# Patient Record
Sex: FEMALE | Race: WHITE | Employment: FULL TIME | URBAN - METROPOLITAN AREA
[De-identification: names, ages, dates, MRNs, and addresses within clinical notes are randomized per-mention and may not be internally consistent; named-entity substitution may affect disease eponyms.]

---

## 2019-06-06 VITALS — BODY MASS INDEX: 38.92 KG/M2 | WEIGHT: 248 LBS | HEIGHT: 67 IN

## 2019-06-06 RX ORDER — SODIUM CHLORIDE 0.9 % (FLUSH) 0.9 %
5-40 SYRINGE (ML) INJECTION AS NEEDED
Status: CANCELLED | OUTPATIENT
Start: 2019-06-06

## 2019-06-06 RX ORDER — CEFAZOLIN SODIUM/WATER 2 G/20 ML
2 SYRINGE (ML) INTRAVENOUS ONCE
Status: CANCELLED | OUTPATIENT
Start: 2019-06-06 | End: 2019-06-06

## 2019-06-06 RX ORDER — SODIUM CHLORIDE 0.9 % (FLUSH) 0.9 %
5-40 SYRINGE (ML) INJECTION EVERY 8 HOURS
Status: CANCELLED | OUTPATIENT
Start: 2019-06-06

## 2019-06-13 ENCOUNTER — HOSPITAL ENCOUNTER (OUTPATIENT)
Dept: SURGERY | Age: 34
Discharge: HOME | End: 2019-06-13

## 2019-06-17 VITALS — HEIGHT: 68 IN | WEIGHT: 250 LBS | BODY MASS INDEX: 37.89 KG/M2

## 2019-06-17 RX ORDER — IBUPROFEN 200 MG
200 TABLET ORAL
COMMUNITY

## 2019-06-17 NOTE — PERIOP NOTES
Patient verified name and . Order for consent yes found in EHR and matches case posting; patient verifies procedure. Type 1bsurgery, phone assessment complete. Orders yes received. Labs per surgeon: none  Labs per anesthesia protocol: none      Patient answered medical/surgical history questions at their best of ability. All prior to admission medications documented in [facility] Care. Patient instructed to take the following medications the day of surgery according to anesthesia guidelines with a small sip of water: none . Hold all vitamins 7 days prior to surgery and NSAIDS 5 days prior to surgery. Prescription meds to hold:none    Patient instructed on the following:  Arrive at A Entrance, time of arrival to be called the day before by 1700  NPO after midnight including gum, mints, and ice chips  Responsible adult must drive patient to the hospital, stay during surgery, and patient will need supervision 24 hours after anesthesia  Use antibacterial soap in shower the night before surgery and on the morning of surgery  All piercings must be removed prior to arrival.    Leave all valuables (money and jewelry) at home but bring insurance card and ID on       DOS. Do not wear make-up, nail polish, lotions, cologne, perfumes, powders, or oil on skin. Patient teach back successful and patient demonstrates knowledge of instruction.

## 2019-06-18 PROBLEM — S83.242A TEAR OF MEDIAL MENISCUS OF LEFT KNEE: Status: ACTIVE | Noted: 2019-06-18

## 2019-06-18 PROBLEM — S83.32XA TEAR OF ARTICULAR CARTILAGE OF LEFT KNEE AS CURRENT INJURY: Status: ACTIVE | Noted: 2019-06-18

## 2019-06-18 NOTE — BRIEF OP NOTE
BRIEF OPERATIVE NOTE    Date of Procedure: 6/20/2019     Preoperative Diagnosis:  OSTEOCHONDRAL LESION MEDIAL FEMORAL CONDYLE LEFT KNEE      MEDIAL MENISCAL TEAR LEFT KNEE    Postoperative Diagnosis:  OSTEOCHONDRAL LESION MEDIAL FEMORAL CONDYLE LEFT KNEE      OSTEOCHONDRAL LESION TROCHLEA LEFT KNEE      LOOSE BODIES LEFT KNEE    Procedure(s): 1. ARTHROSCOPY LEFT KNEE REMOVAL LOOSE BODIES     2. OPEN OSTEOCHONDRAL ALLOGRAFT TRANSFER MEDIAL FEMORAL CONDYLE, TROCHLEA LEFT KNEE    Surgeon(s) and Role:     * Sean Tidwell MD - Primary         Assistant Staff: None      Surgical Staff:  Circ-1: (Unknown)  Scrub Tech-1: (Unknown)  Scrub Tech-2: (Unknown)  Scrub Tech-3: (Unknown)  No case tracking events are documented in the log. Anesthesia:  GENERAL WITH FEMORAL NERVE BLOCK    Estimated Blood Loss: 20 cc. Complications: NONE    Implants:   Implant Name Type Inv.  Item Serial No.  Lot No. LRB No. Used Action   GRAFT BNE FEM LT MED OC --  - [id number]  GRAFT BNE FEM LT MED OC --  [id number] [employer] [id number] Left 1 Implanted       TOURNIQUET:  95 MINUTES    Anna Shen MD

## 2019-06-18 NOTE — H&P
[facility] HISTORY AND PHYSICAL    Subjective:     Patient is a 35 y.o. FEMALE WITH LEFT KNEE PAIN. SEE OFFICE NOTE. Patient Active Problem List    Diagnosis Date Noted    Tear of articular cartilage of left knee as current injury 06/18/2019    Tear of medial meniscus of left knee 06/18/2019     No past medical history on file. No past surgical history on file. Prior to Admission medications    Medication Sig Start Date End Date Taking? Authorizing Provider   ibuprofen (MOTRIN) 200 mg tablet Take 200 mg by mouth every eight (8) hours as needed. Indications: Pain    Provider, Historical     Allergies   Allergen Reactions    Amoxicillin Hives and Swelling    Penicillin Hives      Social History     Tobacco Use    Smoking status: Never Smoker    Smokeless tobacco: Never Used   Substance Use Topics    Alcohol use: Not Currently      Family History   Problem Relation Age of Onset    No Known Problems Mother     No Known Problems Father       Review of Systems  A comprehensive review of systems was negative except for that written in the HPI. Objective:     No data found. Visit Vitals  LMP 06/10/2019 (Exact Date)     General:  Alert, cooperative, no distress, appears stated age. Head:  Normocephalic, without obvious abnormality, atraumatic. Back:   Symmetric, no curvature. ROM normal. No CVA tenderness. Lungs:   Clear to auscultation bilaterally. Chest wall:  No tenderness or deformity. Heart:  Regular rate and rhythm, S1, S2 normal, no murmur, click, rub or gallop. Extremities: Extremities normal, atraumatic, no cyanosis or edema. Pulses: 2+ and symmetric all extremities. Skin: Skin color, texture, turgor normal. No rashes or lesions. Lymph nodes: Cervical, supraclavicular, and axillary nodes normal.   Neurologic: CNII-XII intact. Normal strength, sensation and reflexes throughout.            Assessment:     Principal Problem: Tear of articular cartilage of left knee as current injury (6/18/2019)    Active Problems:    Tear of medial meniscus of left knee (6/18/2019)        Plan:     The various methods of treatment have been discussed with the patient and family. PATIENT HAS EXHAUSTED NON-OPERATIVE MODALITIES     After consideration of risks, benefits and other options for treatment, the patient has consented to surgical intervention.     SEE OFFICE NOTE    Mone Abbott MD

## 2019-06-20 ENCOUNTER — ANESTHESIA (OUTPATIENT)
Dept: SURGERY | Age: 34
End: 2019-06-20
Payer: COMMERCIAL

## 2019-06-20 ENCOUNTER — HOSPITAL ENCOUNTER (OUTPATIENT)
Age: 34
Setting detail: OBSERVATION
Discharge: HOME | End: 2019-06-21
Attending: ORTHOPAEDIC SURGERY | Admitting: ORTHOPAEDIC SURGERY
Payer: COMMERCIAL

## 2019-06-20 ENCOUNTER — ANESTHESIA EVENT (OUTPATIENT)
Dept: SURGERY | Age: 34
End: 2019-06-20
Payer: COMMERCIAL

## 2019-06-20 ENCOUNTER — APPOINTMENT (OUTPATIENT)
Dept: GENERAL RADIOLOGY | Age: 34
End: 2019-06-20
Attending: ORTHOPAEDIC SURGERY
Payer: COMMERCIAL

## 2019-06-20 PROBLEM — M23.42 BODIES, LOOSE, JOINT, KNEE, LEFT: Status: ACTIVE | Noted: 2019-06-20

## 2019-06-20 PROBLEM — S83.242A TEAR OF MEDIAL MENISCUS OF LEFT KNEE: Status: RESOLVED | Noted: 2019-06-18 | Resolved: 2019-06-20

## 2019-06-20 PROBLEM — M94.262 CHONDROMALACIA OF KNEE, LEFT: Status: ACTIVE | Noted: 2019-06-20

## 2019-06-20 PROBLEM — M94.262 CHONDROMALACIA OF KNEE, LEFT: Status: RESOLVED | Noted: 2019-06-20 | Resolved: 2019-06-20

## 2019-06-20 LAB
EST. AVERAGE GLUCOSE BLD GHB EST-MCNC: 114 MG/DL
GLUCOSE BLD STRIP.AUTO-MCNC: 104 MG/DL (ref 65–100)
HBA1C MFR BLD: 5.6 %
HCG UR QL: NEGATIVE

## 2019-06-20 PROCEDURE — 74011250636 HC RX REV CODE- 250/636

## 2019-06-20 PROCEDURE — 77030019940 HC BLNKT HYPOTHRM STRY -B: Performed by: ANESTHESIOLOGY

## 2019-06-20 PROCEDURE — 77030013708 HC HNDPC SUC IRR PULS STRY –B: Performed by: ORTHOPAEDIC SURGERY

## 2019-06-20 PROCEDURE — L1830 KO IMMOB CANVAS LONG PRE OTS: HCPCS | Performed by: ORTHOPAEDIC SURGERY

## 2019-06-20 PROCEDURE — 74011250636 HC RX REV CODE- 250/636: Performed by: ANESTHESIOLOGY

## 2019-06-20 PROCEDURE — 77030002937 HC SUT MERS J&J -B: Performed by: ORTHOPAEDIC SURGERY

## 2019-06-20 PROCEDURE — 77030002986 HC SUT PROL J&J -A: Performed by: ORTHOPAEDIC SURGERY

## 2019-06-20 PROCEDURE — 76010010054 HC POST OP PAIN BLOCK: Performed by: ORTHOPAEDIC SURGERY

## 2019-06-20 PROCEDURE — 76010000171 HC OR TIME 2 TO 2.5 HR INTENSV-TIER 1: Performed by: ORTHOPAEDIC SURGERY

## 2019-06-20 PROCEDURE — 73560 X-RAY EXAM OF KNEE 1 OR 2: CPT

## 2019-06-20 PROCEDURE — 77030033005 HC TBNG ARTHSC PMP STRY -B: Performed by: ORTHOPAEDIC SURGERY

## 2019-06-20 PROCEDURE — 74011250636 HC RX REV CODE- 250/636: Performed by: ORTHOPAEDIC SURGERY

## 2019-06-20 PROCEDURE — 77030002916 HC SUT ETHLN J&J -A: Performed by: ORTHOPAEDIC SURGERY

## 2019-06-20 PROCEDURE — 77010033678 HC OXYGEN DAILY

## 2019-06-20 PROCEDURE — 77030018836 HC SOL IRR NACL ICUM -A: Performed by: ORTHOPAEDIC SURGERY

## 2019-06-20 PROCEDURE — 77030004434 HC BUR RND STRY -B: Performed by: ORTHOPAEDIC SURGERY

## 2019-06-20 PROCEDURE — 77030006891 HC BLD SHV RESECT STRY -B: Performed by: ORTHOPAEDIC SURGERY

## 2019-06-20 PROCEDURE — 77030003666 HC NDL SPINAL BD -A: Performed by: ORTHOPAEDIC SURGERY

## 2019-06-20 PROCEDURE — 74011000258 HC RX REV CODE- 258: Performed by: ORTHOPAEDIC SURGERY

## 2019-06-20 PROCEDURE — 94760 N-INVAS EAR/PLS OXIMETRY 1: CPT

## 2019-06-20 PROCEDURE — 99218 HC RM OBSERVATION: CPT

## 2019-06-20 PROCEDURE — 77030020163 HC SEAL HEMSTAT HALY -B: Performed by: ORTHOPAEDIC SURGERY

## 2019-06-20 PROCEDURE — 77030031139 HC SUT VCRL2 J&J -A: Performed by: ORTHOPAEDIC SURGERY

## 2019-06-20 PROCEDURE — 77030034270: Performed by: ORTHOPAEDIC SURGERY

## 2019-06-20 PROCEDURE — 74011000250 HC RX REV CODE- 250: Performed by: ANESTHESIOLOGY

## 2019-06-20 PROCEDURE — 74011000250 HC RX REV CODE- 250

## 2019-06-20 PROCEDURE — 77030000032 HC CUF TRNQT ZIMM -B: Performed by: ORTHOPAEDIC SURGERY

## 2019-06-20 PROCEDURE — 77030019908 HC STETH ESOPH SIMS -A: Performed by: ANESTHESIOLOGY

## 2019-06-20 PROCEDURE — 77030006668 HC BLD SHV MENSCS STRY -B: Performed by: ORTHOPAEDIC SURGERY

## 2019-06-20 PROCEDURE — 76060000035 HC ANESTHESIA 2 TO 2.5 HR: Performed by: ORTHOPAEDIC SURGERY

## 2019-06-20 PROCEDURE — 81025 URINE PREGNANCY TEST: CPT

## 2019-06-20 PROCEDURE — 77030010509 HC AIRWY LMA MSK TELE -A: Performed by: ANESTHESIOLOGY

## 2019-06-20 PROCEDURE — 82962 GLUCOSE BLOOD TEST: CPT

## 2019-06-20 PROCEDURE — 76942 ECHO GUIDE FOR BIOPSY: CPT | Performed by: ORTHOPAEDIC SURGERY

## 2019-06-20 PROCEDURE — 77030006788 HC BLD SAW OSC STRY -B: Performed by: ORTHOPAEDIC SURGERY

## 2019-06-20 PROCEDURE — 77030022036 HC BLD SHV TOMCAT STRY -B: Performed by: ORTHOPAEDIC SURGERY

## 2019-06-20 PROCEDURE — 77030018986 HC SUT ETHBND4 J&J -B: Performed by: ORTHOPAEDIC SURGERY

## 2019-06-20 PROCEDURE — 77030003602 HC NDL NRV BLK BBMI -B: Performed by: ANESTHESIOLOGY

## 2019-06-20 PROCEDURE — 83036 HEMOGLOBIN GLYCOSYLATED A1C: CPT

## 2019-06-20 PROCEDURE — 77030012935 HC DRSG AQUACEL BMS -B: Performed by: ORTHOPAEDIC SURGERY

## 2019-06-20 PROCEDURE — 76210000006 HC OR PH I REC 0.5 TO 1 HR: Performed by: ORTHOPAEDIC SURGERY

## 2019-06-20 DEVICE — IMPLANTABLE DEVICE: Type: IMPLANTABLE DEVICE | Site: KNEE | Status: FUNCTIONAL

## 2019-06-20 RX ORDER — SODIUM CHLORIDE 0.9 % (FLUSH) 0.9 %
5-40 SYRINGE (ML) INJECTION AS NEEDED
Status: DISCONTINUED | OUTPATIENT
Start: 2019-06-20 | End: 2019-06-20 | Stop reason: HOSPADM

## 2019-06-20 RX ORDER — SODIUM CHLORIDE 0.9 % (FLUSH) 0.9 %
5-40 SYRINGE (ML) INJECTION EVERY 8 HOURS
Status: DISCONTINUED | OUTPATIENT
Start: 2019-06-20 | End: 2019-06-20 | Stop reason: HOSPADM

## 2019-06-20 RX ORDER — SODIUM CHLORIDE 9 MG/ML
75 INJECTION, SOLUTION INTRAVENOUS CONTINUOUS
Status: DISCONTINUED | OUTPATIENT
Start: 2019-06-20 | End: 2019-06-21 | Stop reason: HOSPADM

## 2019-06-20 RX ORDER — CEFAZOLIN SODIUM/WATER 2 G/20 ML
2 SYRINGE (ML) INTRAVENOUS ONCE
Status: COMPLETED | OUTPATIENT
Start: 2019-06-20 | End: 2019-06-20

## 2019-06-20 RX ORDER — FENTANYL CITRATE 50 UG/ML
INJECTION, SOLUTION INTRAMUSCULAR; INTRAVENOUS AS NEEDED
Status: DISCONTINUED | OUTPATIENT
Start: 2019-06-20 | End: 2019-06-20 | Stop reason: HOSPADM

## 2019-06-20 RX ORDER — TEMAZEPAM 15 MG/1
15 CAPSULE ORAL
Status: DISCONTINUED | OUTPATIENT
Start: 2019-06-20 | End: 2019-06-21 | Stop reason: HOSPADM

## 2019-06-20 RX ORDER — LIDOCAINE HYDROCHLORIDE 10 MG/ML
0.3 INJECTION INFILTRATION; PERINEURAL ONCE
Status: COMPLETED | OUTPATIENT
Start: 2019-06-20 | End: 2019-06-20

## 2019-06-20 RX ORDER — BUPIVACAINE HYDROCHLORIDE AND EPINEPHRINE 5; 5 MG/ML; UG/ML
INJECTION, SOLUTION EPIDURAL; INTRACAUDAL; PERINEURAL
Status: COMPLETED | OUTPATIENT
Start: 2019-06-20 | End: 2019-06-20

## 2019-06-20 RX ORDER — OXYCODONE HYDROCHLORIDE 5 MG/1
10 TABLET ORAL
Status: DISCONTINUED | OUTPATIENT
Start: 2019-06-20 | End: 2019-06-20 | Stop reason: HOSPADM

## 2019-06-20 RX ORDER — DEXAMETHASONE SODIUM PHOSPHATE 4 MG/ML
INJECTION, SOLUTION INTRA-ARTICULAR; INTRALESIONAL; INTRAMUSCULAR; INTRAVENOUS; SOFT TISSUE AS NEEDED
Status: DISCONTINUED | OUTPATIENT
Start: 2019-06-20 | End: 2019-06-20 | Stop reason: HOSPADM

## 2019-06-20 RX ORDER — LIDOCAINE HYDROCHLORIDE 20 MG/ML
INJECTION, SOLUTION EPIDURAL; INFILTRATION; INTRACAUDAL; PERINEURAL AS NEEDED
Status: DISCONTINUED | OUTPATIENT
Start: 2019-06-20 | End: 2019-06-20 | Stop reason: HOSPADM

## 2019-06-20 RX ORDER — HYDROMORPHONE HYDROCHLORIDE 2 MG/1
4 TABLET ORAL
Status: DISCONTINUED | OUTPATIENT
Start: 2019-06-20 | End: 2019-06-21 | Stop reason: HOSPADM

## 2019-06-20 RX ORDER — ACETAMINOPHEN 10 MG/ML
1000 INJECTION, SOLUTION INTRAVENOUS ONCE
Status: COMPLETED | OUTPATIENT
Start: 2019-06-20 | End: 2019-06-20

## 2019-06-20 RX ORDER — SODIUM CHLORIDE 0.9 % (FLUSH) 0.9 %
5-40 SYRINGE (ML) INJECTION AS NEEDED
Status: DISCONTINUED | OUTPATIENT
Start: 2019-06-20 | End: 2019-06-21 | Stop reason: HOSPADM

## 2019-06-20 RX ORDER — PROPOFOL 10 MG/ML
INJECTION, EMULSION INTRAVENOUS AS NEEDED
Status: DISCONTINUED | OUTPATIENT
Start: 2019-06-20 | End: 2019-06-20 | Stop reason: HOSPADM

## 2019-06-20 RX ORDER — FACIAL-BODY WIPES
10 EACH TOPICAL DAILY PRN
Status: DISCONTINUED | OUTPATIENT
Start: 2019-06-20 | End: 2019-06-21 | Stop reason: HOSPADM

## 2019-06-20 RX ORDER — HYDROMORPHONE HYDROCHLORIDE 2 MG/ML
0.5 INJECTION, SOLUTION INTRAMUSCULAR; INTRAVENOUS; SUBCUTANEOUS
Status: DISCONTINUED | OUTPATIENT
Start: 2019-06-20 | End: 2019-06-20 | Stop reason: HOSPADM

## 2019-06-20 RX ORDER — ONDANSETRON 2 MG/ML
4 INJECTION INTRAMUSCULAR; INTRAVENOUS ONCE
Status: COMPLETED | OUTPATIENT
Start: 2019-06-20 | End: 2019-06-20

## 2019-06-20 RX ORDER — MIDAZOLAM HYDROCHLORIDE 1 MG/ML
2 INJECTION, SOLUTION INTRAMUSCULAR; INTRAVENOUS
Status: COMPLETED | OUTPATIENT
Start: 2019-06-20 | End: 2019-06-20

## 2019-06-20 RX ORDER — HYDROMORPHONE HYDROCHLORIDE 2 MG/1
2 TABLET ORAL
Status: DISCONTINUED | OUTPATIENT
Start: 2019-06-20 | End: 2019-06-21 | Stop reason: HOSPADM

## 2019-06-20 RX ORDER — SODIUM CHLORIDE, SODIUM LACTATE, POTASSIUM CHLORIDE, CALCIUM CHLORIDE 600; 310; 30; 20 MG/100ML; MG/100ML; MG/100ML; MG/100ML
100 INJECTION, SOLUTION INTRAVENOUS CONTINUOUS
Status: DISCONTINUED | OUTPATIENT
Start: 2019-06-20 | End: 2019-06-20 | Stop reason: HOSPADM

## 2019-06-20 RX ORDER — KETOROLAC TROMETHAMINE 30 MG/ML
INJECTION, SOLUTION INTRAMUSCULAR; INTRAVENOUS AS NEEDED
Status: DISCONTINUED | OUTPATIENT
Start: 2019-06-20 | End: 2019-06-20 | Stop reason: HOSPADM

## 2019-06-20 RX ORDER — FENTANYL CITRATE 50 UG/ML
100 INJECTION, SOLUTION INTRAMUSCULAR; INTRAVENOUS ONCE
Status: COMPLETED | OUTPATIENT
Start: 2019-06-20 | End: 2019-06-20

## 2019-06-20 RX ORDER — HYDROMORPHONE HYDROCHLORIDE 2 MG/ML
1 INJECTION, SOLUTION INTRAMUSCULAR; INTRAVENOUS; SUBCUTANEOUS
Status: DISCONTINUED | OUTPATIENT
Start: 2019-06-20 | End: 2019-06-21 | Stop reason: HOSPADM

## 2019-06-20 RX ORDER — DOCUSATE SODIUM 100 MG/1
100 CAPSULE, LIQUID FILLED ORAL 2 TIMES DAILY
Status: DISCONTINUED | OUTPATIENT
Start: 2019-06-21 | End: 2019-06-21 | Stop reason: HOSPADM

## 2019-06-20 RX ORDER — ONDANSETRON 2 MG/ML
INJECTION INTRAMUSCULAR; INTRAVENOUS AS NEEDED
Status: DISCONTINUED | OUTPATIENT
Start: 2019-06-20 | End: 2019-06-20 | Stop reason: HOSPADM

## 2019-06-20 RX ORDER — SODIUM CHLORIDE 0.9 % (FLUSH) 0.9 %
5-40 SYRINGE (ML) INJECTION EVERY 8 HOURS
Status: DISCONTINUED | OUTPATIENT
Start: 2019-06-20 | End: 2019-06-21 | Stop reason: HOSPADM

## 2019-06-20 RX ADMIN — FENTANYL CITRATE 100 MCG: 50 INJECTION INTRAMUSCULAR; INTRAVENOUS at 12:07

## 2019-06-20 RX ADMIN — FENTANYL CITRATE 50 MCG: 50 INJECTION, SOLUTION INTRAMUSCULAR; INTRAVENOUS at 13:44

## 2019-06-20 RX ADMIN — FENTANYL CITRATE 25 MCG: 50 INJECTION, SOLUTION INTRAMUSCULAR; INTRAVENOUS at 15:21

## 2019-06-20 RX ADMIN — PROPOFOL 200 MG: 10 INJECTION, EMULSION INTRAVENOUS at 13:36

## 2019-06-20 RX ADMIN — SODIUM CHLORIDE, SODIUM LACTATE, POTASSIUM CHLORIDE, AND CALCIUM CHLORIDE: 600; 310; 30; 20 INJECTION, SOLUTION INTRAVENOUS at 14:09

## 2019-06-20 RX ADMIN — Medication 2 G: at 13:40

## 2019-06-20 RX ADMIN — FENTANYL CITRATE 25 MCG: 50 INJECTION, SOLUTION INTRAMUSCULAR; INTRAVENOUS at 15:13

## 2019-06-20 RX ADMIN — SODIUM CHLORIDE 1000 MG: 900 INJECTION, SOLUTION INTRAVENOUS at 22:24

## 2019-06-20 RX ADMIN — LIDOCAINE HYDROCHLORIDE 0.3 ML: 10 INJECTION, SOLUTION INFILTRATION; PERINEURAL at 10:14

## 2019-06-20 RX ADMIN — BUPIVACAINE HYDROCHLORIDE AND EPINEPHRINE 30 ML: 5; 5 INJECTION, SOLUTION EPIDURAL; INTRACAUDAL; PERINEURAL at 12:36

## 2019-06-20 RX ADMIN — FENTANYL CITRATE 50 MCG: 50 INJECTION, SOLUTION INTRAMUSCULAR; INTRAVENOUS at 15:03

## 2019-06-20 RX ADMIN — ONDANSETRON 4 MG: 2 INJECTION INTRAMUSCULAR; INTRAVENOUS at 15:26

## 2019-06-20 RX ADMIN — DEXAMETHASONE SODIUM PHOSPHATE 10 MG: 4 INJECTION, SOLUTION INTRA-ARTICULAR; INTRALESIONAL; INTRAMUSCULAR; INTRAVENOUS; SOFT TISSUE at 13:55

## 2019-06-20 RX ADMIN — LIDOCAINE HYDROCHLORIDE 100 MG: 20 INJECTION, SOLUTION EPIDURAL; INFILTRATION; INTRACAUDAL; PERINEURAL at 13:36

## 2019-06-20 RX ADMIN — PROMETHAZINE HYDROCHLORIDE 3.12 MG: 25 INJECTION INTRAMUSCULAR; INTRAVENOUS at 16:38

## 2019-06-20 RX ADMIN — MIDAZOLAM 2 MG: 1 INJECTION INTRAMUSCULAR; INTRAVENOUS at 12:07

## 2019-06-20 RX ADMIN — ONDANSETRON 4 MG: 2 INJECTION INTRAMUSCULAR; INTRAVENOUS at 16:25

## 2019-06-20 RX ADMIN — ACETAMINOPHEN 1000 MG: 10 INJECTION, SOLUTION INTRAVENOUS at 17:07

## 2019-06-20 RX ADMIN — SODIUM CHLORIDE, SODIUM LACTATE, POTASSIUM CHLORIDE, AND CALCIUM CHLORIDE 100 ML/HR: 600; 310; 30; 20 INJECTION, SOLUTION INTRAVENOUS at 10:14

## 2019-06-20 RX ADMIN — PROPOFOL 100 MG: 10 INJECTION, EMULSION INTRAVENOUS at 13:37

## 2019-06-20 RX ADMIN — FENTANYL CITRATE 50 MCG: 50 INJECTION, SOLUTION INTRAMUSCULAR; INTRAVENOUS at 14:12

## 2019-06-20 RX ADMIN — KETOROLAC TROMETHAMINE 30 MG: 30 INJECTION, SOLUTION INTRAMUSCULAR; INTRAVENOUS at 15:33

## 2019-06-20 RX ADMIN — SODIUM CHLORIDE, SODIUM LACTATE, POTASSIUM CHLORIDE, AND CALCIUM CHLORIDE: 600; 310; 30; 20 INJECTION, SOLUTION INTRAVENOUS at 13:47

## 2019-06-20 NOTE — ANESTHESIA PREPROCEDURE EVALUATION
Relevant Problems   No relevant active problems       Anesthetic History               Review of Systems / Medical History  Patient summary reviewed and pertinent labs reviewed    Pulmonary                   Neuro/Psych              Cardiovascular                  Exercise tolerance: >4 METS     GI/Hepatic/Renal                Endo/Other        Obesity     Other Findings              Physical Exam    Airway  Mallampati: I  TM Distance: > 6 cm  Neck ROM: normal range of motion   Mouth opening: Normal     Cardiovascular    Rhythm: regular  Rate: normal         Dental  No notable dental hx       Pulmonary  Breath sounds clear to auscultation               Abdominal         Other Findings            Anesthetic Plan    ASA: 2  Anesthesia type: general      Post-op pain plan if not by surgeon: peripheral nerve block single      Anesthetic plan and risks discussed with: Patient

## 2019-06-20 NOTE — PROGRESS NOTES
Spiritual Care visit. Initial Visit, Pre Surgery Consult with patient and her . Visit and prayer before patient goes to surgery.     Visit by Kenroy Hughes M.Ed., Th.B. ,Staff

## 2019-06-20 NOTE — PERIOP NOTES
TRANSFER - OUT REPORT:    Verbal report given to Ida Galan RN on Ramandeep Grissom  being transferred to  for routine post - op       Report consisted of patients Situation, Background, Assessment and   Recommendations(SBAR). Information from the following report(s) OR Summary, Procedure Summary, Intake/Output and MAR was reviewed with the receiving nurse. Opportunity for questions and clarification was provided.       Patient transported with:   O2 @ 2 liters

## 2019-06-20 NOTE — ANESTHESIA POSTPROCEDURE EVALUATION
Procedure(s):  KNEE ARTHROSCOPY, LEFT, REMOVAL OF LOOSE BODIES, OPEN OSTEOCHONDRAL ALLOGRAFT TRANSFER MEDIAL FEMORAL CONDYLE, TROCHLEA  LEFT KNEE.     general, regional    Anesthesia Post Evaluation      Multimodal analgesia: multimodal analgesia used between 6 hours prior to anesthesia start to PACU discharge  Patient location during evaluation: PACU  Patient participation: complete - patient participated  Level of consciousness: awake  Pain management: adequate  Airway patency: patent  Anesthetic complications: no  Cardiovascular status: acceptable  Respiratory status: acceptable  Hydration status: acceptable  Post anesthesia nausea and vomiting:  none      Vitals Value Taken Time   /63 6/20/2019  4:46 PM   Temp 36.6 °C (97.8 °F) 6/20/2019  3:46 PM   Pulse 98 6/20/2019  4:46 PM   Resp 16 6/20/2019  4:46 PM   SpO2 98 % 6/20/2019  4:46 PM

## 2019-06-20 NOTE — DISCHARGE SUMMARY
[address] [facility] Discharge Summary      Patient ID:  Christopher Dalton  [id number]  52 y.o.  1985    Allergies: Amoxicillin and Penicillin    Admit date: 6/20/2019    Discharge date and time: 6/21/2019     Admitting Physician: Itzel Cardoso MD     Discharge Physician: Itzel Cardoso MD      * Admission Diagnoses: Chondromalacia of left knee [E51.025]  Other tear of medial meniscus, current injury, left knee, initial encounter [S83.242A]  Chondromalacia of knee, left [M94.262]    * Discharge Diagnoses:   Hospital Problems as of 6/20/2019 Never Reviewed          Codes Class Noted - Resolved POA    Chondromalacia of knee, left ICD-10-CM: I68.489  ICD-9-CM: 717.7  6/20/2019 - Present Unknown        * (Principal) Tear of articular cartilage of left knee as current injury ICD-10-CM: G27.51KC  ICD-9-CM: 836.2  6/18/2019 - Present Yes        Tear of medial meniscus of left knee ICD-10-CM: R09.084F  ICD-9-CM: 836.0  6/18/2019 - Present Yes              Surgeon: Itzel Cardoso MD          * Procedure: Procedure(s):  LEFT KNEE ARTHROSCOPY WITH OPEN OSTEOCHONDRAL ALLOGRAFT TRANSFER TO THE MEDIAL FEMORAL CONDYLE GEN/FEMORAL NERVE BLOCK           Perioperative Antibiotics: Ancef  __X_                                                Vancomycin  ___          Post Op complications: none        * Discharge Condition: good  Wound appears to be healing without any evidence of infection.          * Discharged to: Home    * Follow-up Care/Discharge instructions:  - Resume pre hospital diet            - Resume home medications per medical continuation form     CONTINUE PHYSICAL THERAPY  KNEE IMMOBILIZER LEFT KNEE AT ALL TIMES  Toe touch WEIGHT BEARING LEFT KNEE   - Follow up in office as scheduled       Signed:  Itzel Cardoso MD  6/20/2019  10:22 AM

## 2019-06-20 NOTE — PROGRESS NOTES
TRANSFER - IN REPORT:    Verbal report received from Vladimir Daniel rn(name) on [employer]  being received from [place]) for routine progression of care      Report consisted of patients Situation, Background, Assessment and   Recommendations(SBAR). Information from the following report(s) SBAR, Procedure Summary, Intake/Output, MAR and Recent Results was reviewed with the receiving nurse. Opportunity for questions and clarification was provided. Assessment completed upon patients arrival to unit and care assumed.

## 2019-06-20 NOTE — H&P
Date of Surgery Update:  Ghassan Silverman was seen and examined. History and physical has been reviewed. The patient has been examined.  There have been no significant clinical changes since the completion of the originally dated History and Physical.    Signed By: Kenisha Goff MD     June 20, 2019 9:29 AM

## 2019-06-20 NOTE — ANESTHESIA PROCEDURE NOTES
Peripheral Block    Start time: 6/20/2019 12:30 PM  End time: 6/20/2019 12:33 PM  Performed by: Jimmy Chavez MD  Authorized by: Jimmy Chavez MD       Pre-procedure:    Indications: at surgeon's request, post-op pain management and procedure for pain    Preanesthetic Checklist: patient identified, risks and benefits discussed, site marked, timeout performed, anesthesia consent given and patient being monitored    Timeout Time: 12:30          Block Type:   Block Type:  Femoral single shot  Laterality:  Left  Monitoring:  Standard ASA monitoring, continuous pulse ox, frequent vital sign checks, heart rate, responsive to questions and oxygen  Injection Technique:  Single shot  Procedures: ultrasound guided    Patient Position: supine  Prep: chlorhexidine    Location:  Upper thigh  Needle Type:  Stimuplex  Needle Gauge:  21 G  Needle Localization:  Ultrasound guidance, anatomical landmarks and nerve stimulator    Assessment:  Number of attempts:  1  Injection Assessment:  Incremental injection every 5 mL, local visualized surrounding nerve on ultrasound, negative aspiration for blood, no paresthesia, no intravascular symptoms and ultrasound image on chart  Patient tolerance:  Patient tolerated the procedure well with no immediate complications

## 2019-06-21 VITALS
HEART RATE: 76 BPM | DIASTOLIC BLOOD PRESSURE: 78 MMHG | BODY MASS INDEX: 37.71 KG/M2 | SYSTOLIC BLOOD PRESSURE: 115 MMHG | WEIGHT: 248 LBS | TEMPERATURE: 96.2 F | RESPIRATION RATE: 18 BRPM | OXYGEN SATURATION: 93 %

## 2019-06-21 PROCEDURE — 99218 HC RM OBSERVATION: CPT

## 2019-06-21 PROCEDURE — 97116 GAIT TRAINING THERAPY: CPT

## 2019-06-21 PROCEDURE — 94760 N-INVAS EAR/PLS OXIMETRY 1: CPT

## 2019-06-21 PROCEDURE — 77010033678 HC OXYGEN DAILY

## 2019-06-21 PROCEDURE — 74011250636 HC RX REV CODE- 250/636: Performed by: ORTHOPAEDIC SURGERY

## 2019-06-21 PROCEDURE — 97161 PT EVAL LOW COMPLEX 20 MIN: CPT

## 2019-06-21 PROCEDURE — 74011000258 HC RX REV CODE- 258: Performed by: ORTHOPAEDIC SURGERY

## 2019-06-21 PROCEDURE — 74011250637 HC RX REV CODE- 250/637: Performed by: ORTHOPAEDIC SURGERY

## 2019-06-21 RX ADMIN — DOCUSATE SODIUM 100 MG: 100 CAPSULE, LIQUID FILLED ORAL at 09:17

## 2019-06-21 RX ADMIN — HYDROMORPHONE HYDROCHLORIDE 2 MG: 2 TABLET ORAL at 02:03

## 2019-06-21 RX ADMIN — HYDROMORPHONE HYDROCHLORIDE 2 MG: 2 TABLET ORAL at 06:50

## 2019-06-21 RX ADMIN — SODIUM CHLORIDE 1000 MG: 900 INJECTION, SOLUTION INTRAVENOUS at 06:50

## 2019-06-21 RX ADMIN — HYDROMORPHONE HYDROCHLORIDE 2 MG: 2 TABLET ORAL at 10:53

## 2019-06-21 NOTE — PROGRESS NOTES
PHYSICAL THERAPY : Initial Assessment, Discharge and AM 6/21/2019  OBSERVATION: Hospital Day: 2  Payor: [address] [facility] / Plan: [address] / Product Type: PPO /      NAME/AGE/GENDER: Sofia Escobar is a 35 y.o. female   PRIMARY DIAGNOSIS:  [M94.262]  Other tear of medial meniscus, current injury, left knee, initial encounter [S83.242A]   Procedure(s) and Anesthesia Type:     * KNEE ARTHROSCOPY, LEFT, REMOVAL OF LOOSE BODIES, OPEN OSTEOCHONDRAL ALLOGRAFT TRANSFER MEDIAL FEMORAL CONDYLE, TROCHLEA  LEFT KNEE - General (Left)  ICD-10: Treatment Diagnosis:    · Pain in Left Knee (M25.562)  · Stiffness of Left Knee, Not elsewhere classified (M25.662)  · Difficulty in walking, Not elsewhere classified (R26.2)      ASSESSMENT:     Ms. Yosef Sharpe presents supine on contact in KI and agreeable to therapy. Spouse at bedside, pt states she has already been up to the bathroom with his help and states she did well. They have 5 children and she is a . She is TTWB. Issued a written HEP and reviewed with her. She demonstrated her ankle pumps and quad sets. Removed KI for some gentle range of motion and then put back on. She is able to state her weight bearing status and she got up and took steps in room with her crutches without loss of balance. We then did some stair training with the crutches and her spouse close by since he will be helping her at home. She did well with the stairs but was nervous. With walking although she is TTWB she states she feels more comfortable now with NWB. She had a question about her CPM, RN called MD to clarify for pt. On writing this note, pt has already discharged home. This section established at most recent assessment   PROBLEM LIST (Impairments causing functional limitations):   1. none    INTERVENTIONS PLANNED: (Benefits and precautions of physical therapy have been discussed with the patient.)  1. none      TREATMENT PLAN: Frequency/Duration: one time visit for nico and treatment     RECOMMENDED REHABILITATION/EQUIPMENT: (at time of discharge pending progress): None. HISTORY:   History of Present Injury/Illness (Reason for Referral):  Pt s/p above procedure on 6/20/19  Past Medical History/Comorbidities:   Ms. Hedwig Heimlich  has no past medical history of Aneurysm ([address] Arrhythmia, Arthritis, Asthma, Autoimmune disease ([address] CAD (coronary artery disease), Cancer ([address] Chronic kidney disease, Chronic obstructive pulmonary disease ([address] Chronic pain, Coagulation disorder ([address] Diabetes ([address] Endocarditis, GERD (gastroesophageal reflux disease), Heart failure ([address] Hypertension, Liver disease, Morbid obesity ([facility] Nicotine vapor product user, Non-nicotine vapor product user, Psychiatric disorder, PUD (peptic ulcer disease), Rheumatic fever, Seizures ([address] Sleep apnea, Stroke ([facility] [address] [facility] Thromboembolus ([facility] [address] or Thyroid disease. Ms. Hedwig Heimlich  has no past surgical history on file. Social History/Living Environment:   Home Environment: Private residence  # Steps to Enter: 2  Rails to Enter: Yes  Hand Rails : Right  One/Two Story Residence: Two story  Lift Chair Available: No  Living Alone: No  Support Systems: Spouse/Significant Other/Partner  Patient Expects to be Discharged to[de-identified] Private residence  Current DME Used/Available at Home: Crutches  Prior Level of Function/Work/Activity:  Pt independent without assistive devices   Number of Personal Factors/Comorbidities that affect the Plan of Care: 0: LOW COMPLEXITY   EXAMINATION:   Most Recent Physical Functioning:   Gross Assessment: Yes  Gross Assessment  AROM: Within functional limits  Strength: Within functional limits                     Bed Mobility  Supine to Sit: Stand-by assistance; Additional time  Sit to Supine: Stand-by assistance    Transfers  Sit to Stand: Stand-by assistance;Contact guard assistance  Stand to Sit: Stand-by assistance;Contact guard assistance    Balance  Sitting: Intact  Standing: Intact; With support    Posture  Posture (WDL): Within defined limits         Weight Bearing Status  Left Side Weight Bearing: Toe touch  Distance (ft): 15 Feet (ft)(twice)  Ambulation - Level of Assistance: Stand-by assistance;Contact guard assistance  Assistive Device: Crutches  Speed/Esthela: Pace decreased (<100 feet/min); Shuffled  Step Length: Right shortened  Gait Abnormalities: Antalgic;Decreased step clearance;Shuffling gait  Number of Stairs Trained: 3(up and down 3 twice)  Stairs - Level of Assistance: Minimum assistance  Rail Use: Both     Braces/Orthotics: KI at all times           Body Structures Involved:  1. Joints  2. Muscles Body Functions Affected:  1. Movement Related Activities and Participation Affected:  1. Mobility  2. Self Care   Number of elements that affect the Plan of Care: 4+: HIGH COMPLEXITY   CLINICAL PRESENTATION:   Presentation: Stable and uncomplicated: LOW COMPLEXITY   CLINICAL DECISION MAKIN[address] [place] [id number] AM-PAC 6 Clicks   Basic Mobility Inpatient Short Form  How much difficulty does the patient currently have. .. Unable A Lot A Little None   1. Turning over in bed (including adjusting bedclothes, sheets and blankets)? ? 1   ? 2   ? 3   ? 4   2. Sitting down on and standing up from a chair with arms ( e.g., wheelchair, bedside commode, etc.)   ? 1   ? 2   ? 3   ? 4   3. Moving from lying on back to sitting on the side of the bed?   ? 1   ? 2   ? 3   ? 4   How much help from another person does the patient currently need. .. Total A Lot A Little None   4. Moving to and from a bed to a chair (including a wheelchair)? ? 1   ? 2   ? 3   ? 4   5. Need to walk in hospital room? ? 1   ? 2   ? 3   ? 4   6. Climbing 3-5 steps with a railing? ? 1   ? 2   ? 3   ? 4   © , Trustees of [address] [place] [id number], under license to [employer].  All rights reserved     Score:  Initial: 22 Most Recent: X (Date: -- )    Interpretation of Tool:  Represents activities that are increasingly more difficult (i.e. Bed mobility, Transfers, Gait). Medical Necessity:     · none. Reason for Services/Other Comments:  · none. Use of outcome tool(s) and clinical judgement create a POC that gives a: Clear prediction of patient's progress: LOW COMPLEXITY            TREATMENT:   (In addition to Assessment/Re-Assessment sessions the following treatments were rendered)     Pre-treatment Symptoms/Complaints:  none  Pain Initial:   Pain Intensity 1: 6  Post Session:  6/10     Gait Training (10 Minutes):  Gait training to improve and/or restore physical functioning as related to mobility and strength. Ambulated 15 Feet (ft)(twice) with Stand-by assistance;Contact guard assistance using a Crutches and minimal   related to their stance phase and stride length to promote proper body alignment and promote proper body posture. Instruction in performance of stair training. Date:  6/21/19 Date:   Date:     ACTIVITY/EXERCISE AM PM AM PM AM PM   GROUP THERAPY  ?  ?  ?  ?  ?  ? Ankle Pumps 10        Quad Sets 10        Gluteal Sets         Hip ABd/ADduction         Straight Leg Raises         Knee Slides         Short Arc Quads         Long Arc Quads         Chair Slides         Gentle range of motion  10        B = bilateral; AA = active assistive; A = active; P = passive      Treatment/Session Assessment:     Response to Treatment:  pt did well, pain after, RN notified. Education:  ? Home Exercises  ? Fall Precautions   ? D/C Instruction Review  ? crutch Management/Safety ?  Adaptive Equipment as Needed       Interdisciplinary Collaboration:   o Registered Nurse    After treatment position/precautions:   o Supine in bed  o Bed/Chair-wheels locked  o Bed in low position  o Call light within reach  o Family at bedside        Recommendations/Intent for next treatment session:  pt has discharged home, outpatient PT for follow up     Total Treatment Duration:  PT Patient Time In/Time Out  Time In: 1030  Time Out: [address] [place]reras

## 2019-06-21 NOTE — OP NOTES
[address] [address]  OPERATIVE REPORT    Name:  Jackson Graham  MR#:  [id number]  :  1985  ACCOUNT #:  [de-identified]  DATE OF SERVICE:  2019    PREOPERATIVE DIAGNOSES:  1.  Osteochondral lesion of medial femoral condyle, left knee. 2.  Medial meniscal tear, left knee. POSTOPERATIVE DIAGNOSES:  1.  Osteochondral lesion of medial femoral condyle, left knee. 2.  Osteochondral lesion of trochlea, left knee. 3.  Chondromalacia, left knee. 4.  Loose bodies, left knee. PROCEDURE PERFORMED:  Arthroscopy of left knee, removal of loose bodies, open osteochondral allograft transfer to the medial femoral condyle and trochlea, left knee. SURGEON:  Leif Wright Jr, MD    ASSISTANT:  Jak Sullivan, Certified First Assistant. Use of a certified first assistant was necessary to optimize the patient's safety and outcome. ANESTHESIA:  General with femoral nerve block. COMPLICATIONS:  None. ESTIMATED BLOOD LOSS:  20 mL. PATHOLOGY:  1.  Grade IV chondral lesion of the trochlea and medial femoral condyle, each measuring 2 x 2 cm. 2.  Grade II chondromalacia of medial tibial plateau. 3.  Multiple chondral loose bodies. CPT CODE:  77030 for the osteochondral allograft transfer to the medial femoral condyle,  60626 for the osteochondral allograft transfer to the trochlea, which was a separate compartment, and 22657. ICD-10 CODES:  S83.32X x2, M23.42, M94.262. TOTAL TOURNIQUET TIME:  95 minutes. INDICATIONS:  The patient is a 35-year-old female who has developed recalcitrant left knee pain and swelling. Preoperative physical exam, radiographs and an MR demonstrate a grade IV osteochondral lesion of medial femoral condyle as well as medial meniscal tear of left knee. The patient has exhausted nonoperative modalities and electively admitted for operative intervention. PROCEDURE:  Following identification of the patient, the patient was taken to the operative suite.   Following administration of general anesthesia, a femoral nerve block for postop pain control and 2 g of IV Ancef, the patient was positioned on the operating room table in the supine fashion. Left knee was examined under anesthesia and noted to be stable through full range of motion. At this point, the left leg was then prepped and draped in sterile fashion. The left knee was then elevated and exsanguinated with an Esmarch bandage. Tourniquet was elevated to 300 mmHg. At this point, a 5 cm incision extending from the mid patella to the tibial tubercle was then identified and marked. InteguSeal was applied. Once the InteguSeal was allowed to cure, skin was incised. Subcutaneous tissue was then dissected down to the patella proximally, patellar tendon and tibial tubercle distally. The flaps were elevated. The scope was then introduced into the knee. Diagnostic arthroscopy was then commenced. Suprapatellar pouch, medial and lateral gutters were visualized. There was no evidence of any foreign body. There were multiple osteochondral loose bodies, which were removed in their entirety. Undersurface of the patella and trochlear groove were visualized. The patella was noted to be intact. There was a grade IV chondral lesion in the trochlea region, measuring 2 x 2 cm. The scope was advanced to the medial compartment and the leg was flexed to 30 degrees. The medial meniscus was visualized in its entirety as well as the lateral meniscus was intact. There was a grade IV chondral lesion on the weightbearing surface of the medial femoral condyle measuring 2 x 2 cm. There was some grade II chondromalacia of the medial tibial plateau, which was left alone. Scope was advanced to the notch. ACL and PCL were intact. The scope was advanced to the lateral compartment. Her leg was put in figure-of-four position. The lateral meniscus was visualized in its entirety. Both above and below the popliteus hiatus was intact. Articular cartilage was intact. There were multiple chondral loose bodies throughout the knee and these were removed in their entirety. Once all the chondral loose bodies were removed, a medial arthrotomy was then performed. The medial femoral condyle lesion was identified. The unstable edges of the cartilage were debrided in their entirety. This lesion was then incised. It was noted to be 20 mm. At this point, the starter wire was then drilled. The 20 mm size reamer was utilized and the medial femoral condyle lesion was reamed to a depth of roughly 10 mm. Frayed bony edges were debrided. The loose bony material was debrided as well. This was reamed down to viable bleeding bone. At this point with the recipient site of the medial femoral condyle prepared our attention was then turned to preparing the graft. The osteochondral allograft was identified. The appropriate position on the medial femoral condyle was then identified as well. The 20 mm core reamer was identified and utilized. A guide was inserted and the graft was reamed and the 20 x 10 mm graft was harvested. It was contoured to fit the recipient site. The graft was then placed with the use of mallet and inserters, the graft was inserted in the appropriate fashion. This yielded an excellent press-fit of our lesion, completely filling the lesion flush with the native articular cartilage. The medial femoral condyle lesion was in excellent position and the knee was put through range of motion. At this point, attention was then turned to the trochlear lesion. This was sized as well. Again, this is a 20 mm lesion. The guidewire was then passed in the central portion of the lesion. This was then reamed again to a depth of 10 mm with a 20 mm reamer. Bony debris was removed. The lesion was debrided. Again, there was viable bleeding bone. At this point, a second graft was then harvested from the osteochondral allograft.    Again, a 20 mm graft with 10 mm of depth. This was harvested. It was contoured to fit the recipient site and again it was malleted into place. This yielded an excellent fit and fill of our trochlear lesion and it was flush with the native articular cartilage. At this point, the knee was then put through range of motion. The grafts were in excellent position. Scope was introduced back in the knee. Both grafts were noted to be in excellent position. At this point, the knee was then copiously irrigated. Arthroscopic equipment was removed from the knee. The medial parapatellar incision was approximated using #5 Mersilene sutures. The skin was closed with 0 Vicryl figure-of-eight sutures and a 2-0 Prolene subcuticular stitch. A sterile dressing was applied. A knee immobilizer was applied. Tourniquet was released. Tourniquet time was 95 minutes. The patient was then transferred to the recovery room in stable condition. Travis Mclean MD      [id number]  D:  06/20/2019 16:21  T:  06/20/2019 20:05  JOB #:  [id number]  CC:   Mirlande Brady MD

## 2019-06-21 NOTE — PROGRESS NOTES
Orthopedic Joint Progress Note    2019  Admit Date: 2019  Admit Diagnosis: Chondromalacia of left knee [X67.892]  Other tear of medial meniscus, current injury, left knee, initial encounter [S83.242A]  Chondromalacia of knee, left [M94.262]  Tear of articular cartilage of left knee as current injury [S83.32XA]    1 Day Post-Op    Subjective: doing well     Dilan Meyer     Review of Systems: Pertinent items are noted in HPI. Objective:     PT/OT:     PATIENT MOBILITY                           Vital Signs:    Blood pressure 107/63, pulse 74, temperature 97.8 °F (36.6 °C), resp. rate 16, weight 112.5 kg (248 lb), last menstrual period 06/10/2019, SpO2 94 %, not currently breastfeeding.   Temp (24hrs), Av °F (36.7 °C), Min:97.8 °F (36.6 °C), Max:98.2 °F (36.8 °C)      Pain Control:   Pain Assessment  Pain Scale 1: Numeric (0 - 10)  Pain Intensity 1: 3  Pain Location 1: Knee  Pain Orientation 1: Left  Pain Description 1: Constant  Pain Intervention(s) 1: Medication (see MAR)    Meds:  Current Facility-Administered Medications   Medication Dose Route Frequency    0.9% sodium chloride infusion  75 mL/hr IntraVENous CONTINUOUS    sodium chloride (NS) flush 5-40 mL  5-40 mL IntraVENous Q8H    sodium chloride (NS) flush 5-40 mL  5-40 mL IntraVENous PRN    bisacodyl (DULCOLAX) suppository 10 mg  10 mg Rectal DAILY PRN    sodium phosphate (FLEET'S) enema 1 Enema  1 Enema Rectal PRN    docusate sodium (COLACE) capsule 100 mg  100 mg Oral BID    HYDROmorphone (PF) (DILAUDID) injection 1 mg  1 mg IntraVENous Q1H PRN    HYDROmorphone (DILAUDID) tablet 2 mg  2 mg Oral Q3H PRN    HYDROmorphone (DILAUDID) tablet 4 mg  4 mg Oral Q3H PRN    temazepam (RESTORIL) capsule 15 mg  15 mg Oral QHS PRN    ceFAZolin (ANCEF) 1,000 mg in 0.9% sodium chloride (MBP/ADV) 50 mL ADV  1 g IntraVENous Q8H        LAB:    No results found for: INR  No results found for: HGB, HGBEXT, HGBEXT, HGBEXT    Wound Knee Left (Active)   Dressing Status Clean, dry, and intact 6/20/2019  8:45 PM   Dressing Type Elastic bandage 6/20/2019  8:45 PM   Splint Type/Material Knee immobilizer 6/20/2019  8:45 PM   Number of days: 1         Physical Exam:  No significant changes    Assessment:      Principal Problem:    Tear of articular cartilage of left knee as current injury (6/18/2019)    Active Problems:    Bodies, loose, joint, knee, left (6/20/2019)         Plan:     Continue PT/OT/Rehab  Toe touch weight bearing left lower extremity  May bear weight to shower  Discharge home    Patient Expects to be Discharged to[de-identified] Unknown

## 2019-06-21 NOTE — PROGRESS NOTES
Pt discharge summary and home medication sheet reviewed and signed by pt. Copy given for take home use. RX for po Dilaudid, Phenergan. And torodol given. Ace wrap removed for left leg and aqualcel dressing dry and intact to incision line. inst  Pt that Dr Angelica Díaz would call her r/t to her CPM and Ice man at home today. Pt voiding well. Appetite good. Pain to knee controlled. Pt leaving hospital via w/c with  and staff member.

## 2019-06-21 NOTE — PROGRESS NOTES
Shift assessment completed. Pt is alert & oriented x4. Able to verbalize needs. Resting quietly with no distress noted. Dressing to left knee is clean, dry and intact. Knee immobilizer in place. Neurovascular and peripheral vascular checks WNL. Incentive Spirometry at bedside. Patient encouraged to use hourly x 10 repetitions. Patient voiding clear yellow urine without difficulty with use of bedpan. Pain is being managed with Dilaudid with patient tolerating well. Bed low and locked. Call light within reach. Patient instructed to call for assistance. Pt verbalizes understanding. Will monitor.

## 2019-06-21 NOTE — DISCHARGE INSTRUCTIONS
INSTRUCTIONS FOLLOWING ARTHROSCOPY SURGERY    ACTIVITY  Toe touch weight bearing, may bear weight to shower   Elevate surgery site first 48 hours.  Use crutches per your doctors instructions.  Bathe or shower as directed by your doctor. DIET   Clear liquids until no nausea or vomiting; then light diet for the first day   Advance to regular diet on second day, unless your doctor orders otherwise.  If nausea and vomiting continues, call your doctor. PAIN   Take pain medication as directed by your doctor.  Call your doctor if pain is NOT relieved by medication.  DO NOT take aspirin or blood thinners until directed by your doctor. DRESSING CARE  Do not get the dressing wet until you have the OK from Dr. Ting Renae. Keep dressing intact unless otherwise specified. FOLLOW-UP PHONE [address] will be made by nursing staff.  If you have any problems or concerns, call your doctor as needed. CALL YOUR DOCTOR or seek medical attention IF:   Excessive bleeding that does not stop after holding mild pressure over the area   Temperature of 101°F or above   Redness, excessive swelling or bruising, and/or green or yellow, smelly discharge from incision    AFTER ANESTHESIA:   For the next 24 hours: DO NOT Drive, Drink alcoholic beverages, or Make important decisions.  Be aware of dizziness following anesthesia and while taking pain medication. Dr. Nicolette Leroy office will call you tomorrow with further instructions. Please call his office with any questions or concerns. Dilaudid 2 Mg tablet, take 1-2 tablets by mouth every 4-6 hours as needed for pain.   Promethazine HCl (Phenergan) 25 Mg tablet, take by mouth every 8 hours for nausea as needed  Ketorolac Tromethamine Tablet 10 mg Take 1 tablet by mouth every 6 hours for 5 days postop       Patient Education        Knee Arthroscopy: What to Expect at [address] [place] [address]    Arthroscopy is a way to find problems and do surgery inside a joint without making a large cut (incision). Your doctor put a lighted tube with a tiny camera--called an arthroscope, or scope--and surgical tools through small incisions in your knee. You will feel tired for several days. Your knee will be swollen, and you may notice that your skin is a different color near the cuts (incisions). The swelling is normal and will start to go away in a few days. Keeping your leg higher than your heart will help with swelling and pain. You will probably need about 6 weeks to recover. If your doctor repaired damaged tissue, recovery will take longer. You may have to limit your activity until your knee strength and movement return to normal. You may also be in a physical rehabilitation (rehab) program.  You may be able to return to a desk job or your normal routine in a few days. But if you do physical labor, it may be as long as 2 months before you can return to work. This care sheet gives you a general idea about how long it will take for you to recover. But each person recovers at a different pace. Follow the steps below to get better as quickly as possible. How can you care for yourself at home? Activity    · Rest when you feel tired. Getting enough sleep will help you recover. Use pillows to raise your ankle and leg above the level of your heart.     · Try to walk each day, after your doctor has said you can. Start by walking a little more than you did the day before. Bit by bit, increase the amount you walk. Walking boosts blood flow and helps prevent pneumonia and constipation.     · You may have a brace or crutches or both.     · Your doctor will tell you how often and how much you can move your leg and knee.     · If you have a desk job, you may be able to return to work a few days after the surgery.  If you lift things or stand or walk a lot at work, it may be as long as 2 months before you can return.     · You can take a shower 48 to 72 hours after surgery and clean the incisions with regular soap and water. Do not take a bath or soak your knee until your doctor says it is okay.     · Ask your doctor when you can drive again.     · If you had a repair of torn tissue, follow your doctor's instructions for lifting things or moving your knee. Diet    · You can eat your normal diet. If your stomach is upset, try bland, low-fat foods like plain rice, broiled chicken, toast, and yogurt.     · Drink plenty of fluids, unless your doctor tells you not to.     · You may notice that your bowel movements are not regular right after your surgery. This is common. Try to avoid constipation and straining with bowel movements. You may want to take a fiber supplement every day. If you have not had a bowel movement after a couple of days, ask your doctor about taking a mild laxative. Medicines    · Your doctor will tell you if and when you can restart your medicines. He or she will also give you instructions about taking any new medicines.     · If you take blood thinners, such as warfarin (Coumadin), clopidogrel (Plavix), or aspirin, be sure to talk to your doctor. He or she will tell you if and when to start taking those medicines again. Make sure that you understand exactly what your doctor wants you to do.     · Be safe with medicines. Take pain medicines exactly as directed. ? If the doctor gave you a prescription medicine for pain, take it as prescribed. ? If you are not taking a prescription pain medicine, ask your doctor if you can take an over-the-counter medicine.     · If you think your pain medicine is making you sick to your stomach:  ? Take your medicine after meals (unless your doctor has told you not to). ? Ask your doctor for a different pain medicine.     · If your doctor prescribed antibiotics, take them as directed. Do not stop taking them just because you feel better. You need to take the full course of antibiotics.    Incision care    · If you have a dressing over your cuts (incisions), keep it clean and dry. You may remove it 48 to 72 hours after the surgery.     · If your incisions are open to the air, keep the area clean and dry.     · If you have strips of tape on the incisions, leave the tape on for a week or until it falls off. Exercise    · Move your toes and ankle as much as your bandages will allow.     · Bend and straighten your knee slowly several times during the day.     · Depending on why you had the surgery, you may have to do ankle and leg exercises. Your doctor or physical therapist will give you exercises as part of a rehabilitation program.     · Stop any activity that causes sharp pain. Talk to your doctor or physical therapist about what sports or other exercise you can do. Ice and elevation    · To reduce swelling and pain, put ice or a cold pack on your knee for 10 to 20 minutes at a time. Do this every 1 to 2 hours. Put a thin cloth between the ice and your skin. Follow-up care is a key part of your treatment and safety. Be sure to make and go to all appointments, and call your doctor if you are having problems. It's also a good idea to know your test results and keep a list of the medicines you take. When should you call for help? Call 911 anytime you think you may need emergency care. For example, call if:    · You passed out (lost consciousness).     · You have severe trouble breathing.     · You have sudden chest pain and shortness of breath, or you cough up blood.    Call your doctor now or seek immediate medical care if:    · Your foot or toes are numb or tingling.     · Your foot is cool or pale, or it changes color.     · You have signs of a blood clot, such as:  ? Pain in your calf, back of the knee, thigh, or groin. ?  Redness and swelling in your leg or groin.     · You are sick to your stomach or cannot keep fluids down.     · You have pain that does not get better after you take pain medicine.     · You have loose stitches, or your incision comes open.     · Bright red blood has soaked through the bandage over your incision.     · You have signs of infection, such as:  ? Increased pain, swelling, warmth, or redness. ? Red streaks leading from the incisions. ? Pus draining from the incisions. ? A fever.    Watch closely for any changes in your health, and be sure to contact your doctor if:    · You do not have a bowel movement after taking a laxative. Where can you learn more? Go to http://teresita-kristopher.info/. Enter R631 in the search box to learn more about \"Knee Arthroscopy: What to Expect at Home. \"  Current as of: September 20, 2018  Content Version: 11.9  © 1593-8717 [employer]. Care instructions adapted under license by [employer] (which disclaims liability or warranty for this information). If you have questions about a medical condition or this instruction, always ask your healthcare professional. [address] any warranty or liability for your use of this information.

## 2019-06-22 NOTE — DISCHARGE SUMMARY
[facility] SUMMARY    Name:  Meeta Palomares  MR#:  [id number]  :  1985  ACCOUNT #:  [de-identified]  ADMIT DATE:  2019  DISCHARGE DATE:  2019    ADMISSION DIAGNOSES:  1.  Osteochondral lesion of medial femoral condyle, left knee. 2.  Osteochondral lesion of trochlea, left knee. 3.  Chondromalacia of left knee. 4.  Loose bodies, left knee. DISCHARGE DIAGNOSES:  1.  Osteochondral lesion of medial femoral condyle, left knee. 2.  Osteochondral lesion of trochlea, left knee. 3.  Chondromalacia of left knee. 4.  Loose bodies, left knee. Please see H and P, operative summaries and consults for details. HISTORY OF PRESENT ILLNESS:  The patient is a 59-year-old female who was admitted on 2019, underwent an uncomplicated arthroscopy of the left knee, removal of loose bodies, open osteochondral allograft transfer to the medial femoral condyle and trochlea, left knee. Due to the magnitude of the operative procedure, the patient was kept overnight for observation. On postoperative day #1, she was afebrile, vital signs were stable. She was discharged home on postoperative day #1. DISCHARGE INSTRUCTIONS:  She will continue therapy on the outside and follow up in my office in 1 week. Mateo Alfaro MD      [id number]  D:  2019 7:01  T:  2019 12:40  JOB #:  [id number]  CC:   Rosa Oppenheim, MD

## 2019-07-03 ENCOUNTER — HOSPITAL ENCOUNTER (OUTPATIENT)
Dept: PHYSICAL THERAPY | Age: 34
Discharge: HOME | End: 2019-07-03
Payer: COMMERCIAL

## 2019-07-03 PROCEDURE — 97161 PT EVAL LOW COMPLEX 20 MIN: CPT

## 2019-07-03 NOTE — THERAPY EVALUATION
Catalina Domínguez  : 1985  Primary: [place] [employer]  Secondary:  [address]  [facility]  [address] [facility] [address], [address], [place] [address].  [phone]   [phone]       OUTPATIENT PHYSICAL THERAPY:Initial Assessment 7/3/2019     ICD-10: Treatment Diagnosis: Pain in left knee (M25.562); Tear of articular cartilage of left knee, current, sequela (S83.32XS); Chondromalacia, left knee (A99.077)  Precautions/Allergies: post-op precautions as ordered. From EMR: Amoxicillin and Penicillin   MD Orders: Eval and Treat; HEP; ROM: \"quad sets, ankle pumps, TTWB\" (19) MEDICAL/REFERRING DIAGNOSIS:s/p L knee scope with open osteochondral allograft transfer to the medial femoral condyle and trochlea  L knee    DATE OF ONSET: 19  REFERRING PHYSICIAN: Federico Hutton MD  RETURN PHYSICIAN APPOINTMENT: 19     INITIAL ASSESSMENT:  Ms. Teodoro Guerrero presents 2 weeks s/p L knee scope with removal of loose bodies, open osteochondral allograft transfer to the medial femoral condyle and trochlea. Post-opswelling, pain, decreased ROM, decreased strength, balance and control deficits, and are limiting basic mobility and normal activity level. She will benefit from PT for guided post-op rehab to promote normalized return of motion, strength, balance and control, and function in order for safe return to her home, work, and exercise activities. Her rehab is expected to be slow secondary to the nature of this procedure. PROBLEM LIST (Impacting functional limitations):  1. Post-op pain and swelling L knee  2. Decreased ROM L knee   3. Decreased functional strength L knee/LE   4. Decreased gait skills INTERVENTIONS PLANNED: (Treatment may consist of any combination of the following)  1. Thermal and electric modalities, manual therapies for pain. 2. Manual therapies and therapeutic exercises for ROM and strength.    3. Gait Training, Therapeutic exercises for gait and balance   TREATMENT PLAN:  Effective Dates: 7/3/2019 TO 10/1/2019 (90 days). Frequency/Duration: 3 times a week for 1 week to next MD follow up, then plan for 2-3 days per week for a total of 90 Day(s)  GOALS: (Goals have been discussed and agreed upon with patient.)  Short-Term Functional Goals: Time Frame: 6 weeks  1. Report no more than 5/10 intermittent pain L knee with basic walking and ADL's, and score greater than 30/80 on the LEFS. 2. L knee PROM is greater than or equal to 0-125 degrees flexion. 3. L quad strength is 5/5 with good terminal extension strength for stability with walking and progressing into strengthening phase. 4. Walking with crutches and appropriate weight bearing status with good mechanics/technique on level surfaces and up/down stairs. Discharge Goals: Time Frame: 12 weeks  1. No more than 3/10 intermittent pain L knee with all normalized daily home and work activities, and score greater than 50/80 on the LEFS. 2. Demonstrate L knee ROM equal to the R for range to return to normalized daily home, work, and exercise activities. 3. Demonstrate good functional knee strength with stair walking. 4. Able to balance with good control in single-leg stand greater than 30 seconds with eyes open, greater than 10 seconds with eyes closed for improved dynamic stability. 5. Independent with [facility] for advanced knee strengthening. OUTCOME MEASURE:   Tool Used: Lower Extremity Functional Scale (LEFS)  Score:  Initial: 18/80 Most Recent: X/80 (Date: -- )   Interpretation of Score: 20 questions each scored on a 5 point scale with 0 representing \"extreme difficulty or unable to perform\" and 4 representing \"no difficulty\". The lower the score, the greater the functional disability. 80/80 represents no disability. Minimal detectable change is 9 points.   MEDICAL NECESSITY:   · Patient is expected to demonstrate progress in strength, range of motion and balance to return to normalized basic mobility and her normal activity level. · Current impairments are limiting her basic mobility skills. REASON FOR SERVICES/OTHER COMMENTS:  · Patient continues to require skilled intervention due to the complexity of the surgical procedure and need for safe, progressive rehab to promote rethrn to normalized mobility and activity level. Total Duration:   PT Patient Time In/Time Out  Time In: 1510  Time Out: 1610    Rehabilitation Potential For Stated Goals: Good  Regarding [place] therapy, I certify that the treatment plan above will be carried out by a therapist or under their direction. Thank you for this referral,    Ricki Mortensen, PT, MSPT, OCS      Referring Physician Signature: Tatiana Li., MD _______________________________ Date _____________       PAIN/SUBJECTIVE:   Initial: Pain Intensity 1: 7(/10 now, 5/10 best, 10/10 worst to L knee)  Post Session: same   HISTORY:   History of Injury/Illness (Reason for Referral): L knee pain since her late teens/early twenties. Was treated and braced at that time. Intermittent problems with it since. Pain and swelling increased 10/2018 with no specific incident. Had ortho consult x2. This surgery was recommended and done on 6/20/19. Was hospitalized for 23-hour observation, then discharged home. Was in knee immobilizer the first two weeks. Using CPM, ice cuff at home as instructed. Sutures removed yesterday, and functional knee brace provided. Currently, there is pain and swelling to the knee. She is using the crutches NWB mostly, but TTWB for balance in shower. CPM is up to 77 degrees. Past Medical History/Comorbidities:Long history of L knee pain  Social History/Living Environment: Lives with , children in a 2-story home. Not able to walk stairs with crutches currently--goes up/down seated. Prior Level of Function/Work/Activity:  Independent with all basic mobility prior to this surgery. Works as a high school English, .  Returns to work mid-August. Was active with walking 2 days per week 30-45 minutes, and was doing cross fit 2-3 days per week prior to pain episode last October. Ambulatory/Rehab Services H2 Model Falls Risk Assessment   Risk Factors:       No Risk Factors Identified Ability to Rise from Chair:       (1)  Pushes up, successful in one attempt   Falls Prevention Plan:       Physical Limitations to Exercise (specify):  Post op precautions and assistive device used currently, with plan to progress to normalized gait as part of plan. Total: (5 or greater = High Risk): 1   ©2010 [facility] of [place] [employer] Patent #[id number]. Federal Law prohibits the replication, distribution or use without written permission from [facility] of [facility]   Current Medications:       Current Outpatient Medications:     ibuprofen (MOTRIN) 200 mg tablet, Take 200 mg by mouth every eight (8) hours as needed. Indications: Pain, Disp: , Rfl:    Date Last Reviewed:  7/3/19   Number of Personal Factors/Comorbidities that affect the Plan of Care: 1-2: MODERATE COMPLEXITY   EXAMINATION:   Observation/Orthostatic Postural Assessment: Comes into clinic with bilateral crutches, NWB L LE, functional brace on L knee. Surgical wound to anterior L knee appears to be healing well. Sutures are removed and wound is closed. There is mild to moderate effusion noted to L knee. Palpation: Tender to L medial and anterior knee. Calf non-tender. ROM:      LLE PROM  L Knee Flexion: 70(in sitting)  L Knee Extension: -10(in supine)    RLE AROM  R Knee Flexion: 130(in sitting)  R Knee Extension: 10(hyperextension in sitting)  RLE PROM  R Knee Flexion: 140  R Knee Extension: 15(hyperextension)      Strength:    L Knee Ext: quad isometric is fair in terminal extension, unable to straight leg raise without assist, 2+ in sitting. R LE grossly 5/5 throughout. Special Tests: None.    Neurological Screen: [place] neuro screen is intact L knee. Functional Mobility: Sit to/from Stand: compensatory independent NWB L, increased use of hands. Bed Mobility: compensatory independent with use of UEs to assist L LE. Walking on level ground: compensatory independent with use of bilateral crutches, NWB L LE. Stair walking: unable with crutches. Balance:Not formally assessed, but good balance with use of crutches. Body Structures Involved:  1. Joints  2. Muscles Body Functions Affected:  1. Neuromusculoskeletal  2. Movement Related Activities and Participation Affected:  1. General Tasks and Demands  2. Mobility  3. Domestic Life  4.  Exercise acitivities   Number of elements (examined above) that affect the Plan of Care: 4+: HIGH COMPLEXITY   CLINICAL PRESENTATION:   Presentation: Stable and uncomplicated: LOW COMPLEXITY   CLINICAL DECISION MAKING:   Use of outcome tool(s) and clinical judgement create a POC that gives a: Clear prediction of patient's progress: LOW COMPLEXITY

## 2019-07-08 NOTE — PROGRESS NOTES
Shiloh Tomlinson  : 1985  Payor: [id number] / Plan: [address] / Product Type: PPO /  [address] at [facility] JUAN MIGUEL SHERWOOD  [address] [facility], [address], [place] [address].  [phone]   [phone]       OUTPATIENT PHYSICAL THERAPY: Daily Treatment Note 7/3/2019  Visit Count: 1     ICD-10: Treatment Diagnosis: Pain in left knee (M25.562); Tear of articular cartilage of left knee, current, sequela (S83.32XS); Chondromalacia, left knee (L65.712)  Precautions/Allergies: post-op precautions as ordered. From EMR: Amoxicillin and Penicillin   MD Orders: Eval and Treat; HEP; ROM: \"quad sets, ankle pumps, TTWB\" (19) MEDICAL/REFERRING DIAGNOSIS:s/p L knee scope with open osteochondral allograft transfer to the medial femoral condyle and trochlea  L knee    DATE OF ONSET: 19  REFERRING PHYSICIAN: Micheline Koo MD  RETURN PHYSICIAN APPOINTMENT: 19       Pre-treatment Symptoms/Complaints:See initial evaluation report from today. Pain: Initial: Pain Intensity 1: 7(/10 now, 5/10 best, 10/10 worst to L knee)  Post Session:  No change. Medications Last Reviewed:  7/3/19    Updated Objective Findings:   See evaluation note from today     TREATMENT:     Therapeutic Exercise: (5 Minutes): Reviewed and instruction in HEP for quad setting in terminal extension; AAROM straight leg raise hip flexion; and side-lying hip abd; seated self assisted knee extension and   Gait Training: (5 Minutes): Crutches were adjusted for improved fit. Instruction and performance in TTWB L LE with crutch walking on level ground with good return demonstration. Attempted stair walk with crutches and one rail, but unable. HEP: Advised to continue with crutch walking NWB to TTWB on level ground. She is to continue seated ascent/descent on stairs for now. To continue with use of CPM and ice as previously instructed. She is to work on the HEP instructed above as able. She verbalizes understanding.  Will review. Treatment/Session Summary:    · Response to Treatment: She is 2 weeks post op L knee. Good start to early phase pain and swelling control, knee motion, muscle activation, and protected walking. · Communication/Consultation:  None today  · Equipment provided today:  None today  · Recommendations/Intent for next treatment session: Continue with knee motion; quad activation, including NMES; gait training; and modalities as needed for pain and swelling. Treatment Plan of Care Effective Dates: 7/3/2019 TO 10/1/2019 (90 days). Frequency/Duration: 3 times a week for 1 week to next MD follow up, then plan for 2-3 days per week for a total of 90 Day(s). Total Treatment Billable Duration:  No treatment charge.   PT Patient Time In/Time Out  Time In: 1510  Time Out: Bethany 12, PT    Future Appointments   Date Time Provider Joshua Greenwood   7/9/2019 10:45 AM Cary Field, PT [facility]   7/12/2019  9:45 AM DIANE Gutierrez [facility]   7/16/2019  2:30 PM DIANE Gutierrez   7/18/2019  3:15 PM DIANE Gutierrez [facility]

## 2019-07-09 ENCOUNTER — HOSPITAL ENCOUNTER (OUTPATIENT)
Dept: PHYSICAL THERAPY | Age: 34
Discharge: HOME | End: 2019-07-09
Payer: COMMERCIAL

## 2019-07-09 PROCEDURE — 97110 THERAPEUTIC EXERCISES: CPT

## 2019-07-09 PROCEDURE — 97140 MANUAL THERAPY 1/> REGIONS: CPT

## 2019-07-09 NOTE — PROGRESS NOTES
Leoncio Kaplanter  : 1985  Payor: Colt Infante / Plan: [address] / Product Type: PPO /  [address]  at [facility] JUAN MIGUEL SHERWOOD  [address] [facility], [address], [place] [address].  [phone]   [phone]       OUTPATIENT PHYSICAL THERAPY: Daily Treatment Note 2019  Visit Count: 2     ICD-10: Treatment Diagnosis: Pain in left knee (M25.562); Tear of articular cartilage of left knee, current, sequela (S83.32XS); Chondromalacia, left knee (L95.881)  Precautions/Allergies: post-op precautions as ordered. From EMR: Amoxicillin and Penicillin   MD Orders: Eval and Treat; HEP; ROM: \"quad sets, ankle pumps, TTWB\" (19) MEDICAL/REFERRING DIAGNOSIS:s/p L knee scope with open osteochondral allograft transfer to the medial femoral condyle and trochlea  L knee    DATE OF ONSET: 19  REFERRING PHYSICIAN: Tatiana Bernstein MD  RETURN PHYSICIAN APPOINTMENT: 19       Pre-treatment Symptoms/Complaints: Says her knee is feeling \"ok\", but she is not feeling well. Attributes this to the pain medications. Was pretty sore after last session. Using ice daily. Doing the HEP we talked about without issue. Says her CPM machine won't go over 65 degrees now. She is planning on calling the [address] [place] [address] to get this fixed. No word about the NMES unit that has been ordered. Pain: Initial:   5/10 Post Session: Numb after icing. Medications Last Reviewed:  19    Updated Objective Findings:   Observation: Walks into clinic with functional brace on L knee, bilateral crutches. Mild swelling noted to L kneee. ROM:       LLE PROM  L Knee Flexion: 80(in sitting post motion treatment)  L Knee Extension: -5                 Functional Mobility: Walking on level ground: using bilat crutches, TTWB on L, step-to pattern. TREATMENT:     Manual Therapies: (15 Minutes): Brief scar and soft tissue mobilization to anterior L knee for scar and soft tissue restriction.  Joint mobilizations to L knee for stiffness with respect to pain: patellar inferior and superior glides, grade 2 to 3- - to 3-, 4x20\" each way; physiologic knee flexion in sitting with passive support to lower leg, grade 2 to 3- - to 4- - to pain, 4x30\"; physiologic extension in supine, grade 2 to 4- -, 4x30\". Therapeutic Exercise: (30 Minutes): Quad activation with Neuromuscular Electrical Stimulation--2 lead/4 electrode step up to L quads, program 3 used with 12\" on/20\" off, intensity to strong sensation--with active quad setting in supine terminal extension, AAROM SLR, and AAROM seated knee extension. Instruction and performance in green band resisted plantarflexion with knee in extension, and straight leg curl up/sit ups for indirect quad activation to be done with HEP. Therapeutic Modalities: (15 Minutes): Cold and compression for post treatment pain and swelling using Game Ready, low pressure, 40-deg F x15' while supine. No more than skin erythema post. HEP: She is to continue with her HEP with additional exercises instructed in today. She is to continue with with crutch walking NWB to TTWB. SSHe is to get in touch with the CPM provider to get it working correctly to progress range. She verbalizes understanding. Treatment/Session Summary:    · Response to Treatment: Improved knee PROM, and starting to get some quad activation and control. No visible quad contraction with the NMES, but the sensory level help with muscle activation. · Communication/Consultation:  None today  · Equipment provided today:  None today  · Recommendations/Intent for next treatment session: Continue with knee motion; quad activation, including NMES; and modalities as needed for pain and swelling. Treatment Plan of Care Effective Dates: 7/3/2019 TO 10/1/2019 (90 days). Frequency/Duration: 3 times a week for 1 week to next MD follow up, then plan for 2-3 days per week for a total of 90 Day(s). Total Treatment Billable Duration: 60 Minutes.   PT Patient Time In/Time Out  Time In: 1050  Time Out: [address], PT    Future Appointments   Date Time Provider Joshua Greenwood   7/12/2019  9:45 AM Garrett Hoff, DIANE MCC[facility]   7/16/2019  2:30 PM Garrett Hoff, PT [facility]   7/18/2019  3:15 PM Garrett Hoff, PT [facility]

## 2019-07-12 ENCOUNTER — HOSPITAL ENCOUNTER (OUTPATIENT)
Dept: PHYSICAL THERAPY | Age: 34
Discharge: HOME | End: 2019-07-12
Payer: COMMERCIAL

## 2019-07-12 PROCEDURE — 97110 THERAPEUTIC EXERCISES: CPT

## 2019-07-12 PROCEDURE — 97140 MANUAL THERAPY 1/> REGIONS: CPT

## 2019-07-12 PROCEDURE — 97016 VASOPNEUMATIC DEVICE THERAPY: CPT

## 2019-07-12 NOTE — PROGRESS NOTES
Hermes Jasso  : 1985  Payor: Colt Infante / Plan: [address] / Product Type: PPO /  [address] at [facility] JUAN MIGUEL SHERWOOD  [address] [facility], [address], [address] [place] [address]  [phone]   [phone]       OUTPATIENT PHYSICAL THERAPY: Daily Treatment Note 2019  Visit Count: 3     ICD-10: Treatment Diagnosis: Pain in left knee (M25.562); Tear of articular cartilage of left knee, current, sequela (S83.32XS); Chondromalacia, left knee (Q72.724)  Precautions/Allergies: post-op precautions as ordered. From EMR: Amoxicillin and Penicillin   MD Orders: Eval and Treat; HEP; ROM: \"quad sets, ankle pumps, TTWB\" (19) MEDICAL/REFERRING DIAGNOSIS:s/p L knee scope with open osteochondral allograft transfer to the medial femoral condyle and trochlea  L knee    DATE OF ONSET: 19  REFERRING PHYSICIAN: Mimi Aguilar MD  RETURN PHYSICIAN APPOINTMENT: 19       Pre-treatment Symptoms/Complaints: States that last session helpful to knee, especially NMES. Able to lift leg independently to get in bath. NMES unit denied by [employer]. Pain: Initial:   5/10 Post Session: Numb after icing. Medications Last Reviewed:  19    Updated Objective Findings:   Observation: Walks into clinic with functional brace on L knee, bilateral crutches. Mild swelling noted to L kneee. ROM: at session end- approximation:      LLE PROM  L Knee Flexion: 85  L Knee Extension: -5                 Functional Mobility: Walking on level ground: using bilat crutches, TTWB on L, step-to pattern. TREATMENT:     Manual Therapies: (20 Minutes): Gentle scar and soft tissue mobilization to anterior L knee for scar and soft tissue restriction.  Joint mobilizations to L knee for stiffness with respect to pain: patellar inferior and superior glides, grade 3- - to 3-, 3x20\" each way; physiologic knee flexion with distraction in sitting with passive support to lower leg, grade 2 to 3- - to 4- - to pain, 5x30\"; physiologic extension in supine, grade 2 to 4- -, 10x30\". Therapeutic Exercise: (25 Minutes): Quad activation with Neuromuscular Electrical Stimulation--2 lead/4 electrode step up to L quads, program 3 used with 12\" on/20\" off, intensity to strong sensation--with active quad setting in supine terminal extension, AAROM SLR, and AAROM seated knee extension. Instruction and performance in green band resisted plantarflexion with knee in extension, and straight leg curl up/sit ups for indirect quad activation to be done with HEP. Therapeutic Modalities: (15 Minutes): Cold and compression for post treatment pain and swelling using Game Ready, low pressure, 40-deg F x15' while supine. No more than skin erythema post. HEP: She is to continue with her HEP with additional exercises instructed in today. She is to continue with with crutch walking NWB to TTWB. SSHe is to get in touch with the CPM provider to get it working correctly to progress range. She verbalizes understanding. Treatment/Session Summary:    · Response to Treatment: ALmost did SLR independently today a few minutes into NMES session. · Communication/Consultation:  None today  · Equipment provided today:  None today  · Recommendations/Intent for next treatment session: Continue with knee motion; quad activation, including NMES; and modalities as needed for pain and swelling. Treatment Plan of Care Effective Dates: 7/3/2019 TO 10/1/2019 (90 days). Frequency/Duration: 3 times a week for 1 week to next MD follow up, then plan for 2-3 days per week for a total of 90 Day(s). Total Treatment Billable Duration: 55  Minutes.   PT Patient Time In/Time Out  Time In: 8750  Time Out: [address], PT    Future Appointments   Date Time Provider Joshua Greenwood   7/16/2019  2:30 PM Garrett Hfof PT [facility]   7/18/2019  3:15 PM [facility]

## 2019-07-16 ENCOUNTER — HOSPITAL ENCOUNTER (OUTPATIENT)
Dept: PHYSICAL THERAPY | Age: 34
Discharge: HOME | End: 2019-07-16
Payer: COMMERCIAL

## 2019-07-16 PROCEDURE — 97016 VASOPNEUMATIC DEVICE THERAPY: CPT

## 2019-07-16 PROCEDURE — 97110 THERAPEUTIC EXERCISES: CPT

## 2019-07-16 PROCEDURE — 97140 MANUAL THERAPY 1/> REGIONS: CPT

## 2019-07-16 NOTE — PROGRESS NOTES
Samantha Junior  : 1985  Payor: [id number] / Plan: [address] / Product Type: PPO /  [address]  at [facility] JUAN MIGUEL SHERWOOD  [address], [address], [place] [address].  [phone]   [phone]       OUTPATIENT PHYSICAL THERAPY: Daily Treatment Note 2019  Visit Count: 4     ICD-10: Treatment Diagnosis: Pain in left knee (M25.562); Tear of articular cartilage of left knee, current, sequela (S83.32XS); Chondromalacia, left knee (V39.083)  Precautions/Allergies: post-op precautions as ordered. From EMR: Amoxicillin and Penicillin   MD Orders: Eval and Treat; HEP; ROM: \"quad sets, ankle pumps, TTWB\" (19) MEDICAL/REFERRING DIAGNOSIS:s/p L knee scope with open osteochondral allograft transfer to the medial femoral condyle and trochlea  L knee    DATE OF ONSET: 19  REFERRING PHYSICIAN: Felipe Rios MD  RETURN PHYSICIAN APPOINTMENT: 19       Pre-treatment Symptoms/Complaints: States that she woke up feeling bad today and almost did not come. Has knew pain in back of knee. Pain: Initial:   7/10 Post Session: pain levels a little improved   Medications Last Reviewed:  19    Updated Objective Findings:   Observation: =no new findings  ROM: at session end- approximation:      LLE PROM  L Knee Flexion: 85  L Knee Extension: -5                 Functional Mobility: Walking on level ground: using bilat crutches, TTWB on L, step-to pattern. TREATMENT:     Manual Therapies: (20 Minutes): Gentle scar and soft tissue mobilization to anterior L knee for scar and soft tissue restriction. Joint mobilizations to L knee for stiffness with respect to pain: patellar inferior and superior glides, grade 3- - to 3-, 3x20\" each way; physiologic knee flexion with distraction in sitting with passive support to lower leg, grade 2 to 3- - to 4- - to pain, 5x30\"; physiologic extension in supine, grade 2 to 4- -, 10x30\".   myofascial release to  iliotibial band and gastroc  Therapeutic Exercise: (23 Minutes): Quad activation with Neuromuscular Electrical Stimulation--2 lead/4 electrode step up to L quads, program 3 used with 12\" on/20\" off, intensity to strong sensation--with active quad setting in supine terminal extension, AAROM SLR, and AAROM seated knee extension. kinesio taping with y strip method for improved VMO facillitation. Therapeutic Modalities: (15 Minutes): Cold and compression for post treatment pain and swelling using Game Ready, low pressure, 40-deg F x15' while supine. No more than skin erythema post. HEP: She is to continue with her HEP with additional exercises instructed in today. She is to continue with with crutch walking NWB to TTWB. SSHe is to get in touch with the CPM provider to get it working correctly to progress range. She verbalizes understanding. Treatment/Session Summary:    · Response to Treatment: needed more support with SLR today than at last session secondary to pain in knee. Pt to see MD tomorrow. · Communication/Consultation:  None today  · Equipment provided today:  None today  · Recommendations/Intent for next treatment session: Continue with knee motion; quad activation, including NMES; and modalities as needed for pain and swelling. Start side lye knee flexion/extn- PNF. Treatment Plan of Care Effective Dates: 7/3/2019 TO 10/1/2019 (90 days). Frequency/Duration: 3 times a week for 1 week to next MD follow up, then plan for 2-3 days per week for a total of 90 Day(s). Total Treatment Billable Duration: 53  Minutes.   PT Patient Time In/Time Out  Time In: [facility]  Time Out: [address], PT    Future Appointments   Date Time Provider Joshua Greenwood   7/18/2019  3:15 PM Priyank Salas, PT [facility]

## 2019-07-18 ENCOUNTER — HOSPITAL ENCOUNTER (OUTPATIENT)
Dept: PHYSICAL THERAPY | Age: 34
Discharge: HOME | End: 2019-07-18
Payer: COMMERCIAL

## 2019-07-18 PROCEDURE — 97110 THERAPEUTIC EXERCISES: CPT

## 2019-07-18 PROCEDURE — 97140 MANUAL THERAPY 1/> REGIONS: CPT

## 2019-07-18 PROCEDURE — 97016 VASOPNEUMATIC DEVICE THERAPY: CPT

## 2019-07-18 NOTE — PROGRESS NOTES
Mary Grace Correia  : 1985  Payor: Colt Infante / Plan: [id number] / Product Type: PPO /  [address]  at [facility] JUAN MIUGEL SHERWOOD  [address] [facility], [address], [place] [address].  [phone]   [phone]       OUTPATIENT PHYSICAL THERAPY: Daily Treatment Note 2019  Visit Count: 5     ICD-10: Treatment Diagnosis: Pain in left knee (M25.562); Tear of articular cartilage of left knee, current, sequela (S83.32XS); Chondromalacia, left knee (D02.468)  Precautions/Allergies: post-op precautions as ordered. From EMR: Amoxicillin and Penicillin   MD Orders: Eval and Treat; eval and treat, HEIP, ROM, quad sets, ankle pukps, full progressive ROM, 50% partial WB (19) MEDICAL/REFERRING DIAGNOSIS:s/p L knee scope with open osteochondral allograft transfer to the medial femoral condyle and trochlea  L knee    DATE OF ONSET: 19  REFERRING PHYSICIAN: Julia Rose MD  RETURN PHYSICIAN APPOINTMENT: 19       Pre-treatment Symptoms/Complaints: States that she saw Dr. Shwetha Garcia yesterday and has updated order. She is no 50% partial WB. Knee is much better today than pain she started with at last session. Pain: Initial:   5/10 Post Session: pain levels improved   Medications Last Reviewed:  19    Updated Objective Findings:   Observation: =no new findings  ROM: at session end:      LLE PROM  L Knee Flexion: 90  L Knee Extension: -5                 Functional Mobility: Walking on level ground: using bilat crutches, TTWB on L, step-to pattern. TREATMENT:     Manual Therapies: (13 Minutes): scar and soft tissue mobilization to anterior L knee for scar and soft tissue restriction. Joint mobilizations to L knee for stiffness with respect to pain:    physiologic knee flexion with distraction in sitting with passive support to lower leg, grade 2 to 3- - to 4- - to pain, 5x30\"; physiologic extension in supine, grade 2 to 4- -, 10x30\".    Therapeutic Exercise: (30 Minutes): [employer] activation with Neuromuscular Electrical Stimulation--2 lead/4 electrode step up to L quads, program 3 used with 12\" on/20\" off, intensity to strong sensation--with active quad setting in supine terminal extension, AAROM SLR, and AROM seated knee extension. Side lye hip ABD with focus on quad tightening and reaching through heel 10 sec x 7  Supine curl up 30% and hold 5 sec with quad tightening-- 5 reps   - practiced WB 50% with crutches with use of scale. Therapeutic Modalities: (10 Minutes): Cold and compression for post treatment pain and swelling using Game Ready, low pressure, 40-deg F x 10 minutes' while supine. No more than skin erythema post.   HEP: She is to continue with her HEP with additional exercises instructed in today. She is to continue with with crutch walking NWB to TTWB. SSHe is to get in touch with the CPM provider to get it working correctly to progress range. She verbalizes understanding. Treatment/Session Summary:    · Response to Treatment:  Demonstrated understanding of 50% WB with feed back of scale and able to reproduce with eyes closed. May benefit from further feed back on this. · Communication/Consultation:  None today  · Equipment provided today:  None today  · Recommendations/Intent for next treatment session: Continue with knee motion; quad activation, including NMES; and modalities as needed for pain and swelling. Continue work on gait. Treatment Plan of Care Effective Dates: 7/3/2019 TO 10/1/2019 (90 days). Frequency/Duration: 3 times a week for 1 week to next MD follow up, then plan for 2-3 days per week for a total of 90 Day(s). Total Treatment Billable Duration: 53  Minutes.   PT Patient Time In/Time Out  Time In: 1520  Time Out: [address], PT    Future Appointments   Date Time Provider Joshua Greenwood   7/23/2019  1:30 PM DIANE Ricci [facility]   7/25/2019  2:30 PM DIANE Ricci [facility] 7/30/2019  1:45 PM [facility]   8/1/2019  1:45 PM [facility]

## 2019-07-23 ENCOUNTER — HOSPITAL ENCOUNTER (OUTPATIENT)
Dept: PHYSICAL THERAPY | Age: 34
Discharge: HOME | End: 2019-07-23
Payer: COMMERCIAL

## 2019-07-23 NOTE — PROGRESS NOTES
Keyla Maloney  : 1985  Payor: Colt Infante / Plan: [id number] / Product Type: PPO /  [address]  at [facility] JUAN MIGUEL SHERWOOD  [address] [facility], [address], [place] [address].  [phone]   [phone]       OUTPATIENT PHYSICAL THERAPY: Cancellation Note 2019     ICD-10: Treatment Diagnosis: Pain in left knee (M25.562); Tear of articular cartilage of left knee, current, sequela (S83.32XS); Chondromalacia, left knee (M28.928)  Precautions/Allergies: post-op precautions as ordered. From EMR: Amoxicillin and Penicillin   MD Orders: Eval and Treat; eval and treat, HEIP, ROM, quad sets, ankle pukps, full progressive ROM, 50% partial WB (19) MEDICAL/REFERRING DIAGNOSIS:s/p L knee scope with open osteochondral allograft transfer to the medial femoral condyle and trochlea  L knee    DATE OF ONSET: 19  REFERRING PHYSICIAN: Rajni Carrillo MD  RETURN PHYSICIAN APPOINTMENT: 19       Ms. Ciro Fenton called to cancel her appointment this afternoon. We will continue with her current treatment plan on her return.      Rudy Evans PT     Future Appointments   Date Time Provider Joshua Greenwood   2019  1:30 PM Ashok Mckenzie PT [facility]   2019  2:30 PM DIANE Mcrae [facility]   2019  1:45 PM DIANE Traylor [facility]   2019  1:45 PM DIANE McraeTKARLA [facility]

## 2019-07-25 ENCOUNTER — HOSPITAL ENCOUNTER (OUTPATIENT)
Dept: PHYSICAL THERAPY | Age: 34
Discharge: HOME | End: 2019-07-25
Payer: COMMERCIAL

## 2019-07-25 PROCEDURE — 97110 THERAPEUTIC EXERCISES: CPT

## 2019-07-25 PROCEDURE — 97140 MANUAL THERAPY 1/> REGIONS: CPT

## 2019-07-25 NOTE — PROGRESS NOTES
Hector Lamb  : 1985  Payor: [address] Infante / Plan: [id number] / Product Type: PPO /  [address] at [facility] JUAN MIGUEL SHERWOOD  [address] [facility], [address], [address] [facility]  [phone]   [phone]       OUTPATIENT PHYSICAL THERAPY: Daily Treatment Note 2019  Visit Count: 6     ICD-10: Treatment Diagnosis: Pain in left knee (M25.562); Tear of articular cartilage of left knee, current, sequela (S83.32XS); Chondromalacia, left knee (B01.935)  Precautions/Allergies: post-op precautions as ordered. From EMR: Amoxicillin and Penicillin   MD Orders: Eval and Treat; eval and treat, HEIP, ROM, quad sets, ankle pukps, full progressive ROM, 50% partial WB (19) MEDICAL/REFERRING DIAGNOSIS:s/p L knee scope with open osteochondral allograft transfer to the medial femoral condyle and trochlea  L knee    DATE OF ONSET: 19  REFERRING PHYSICIAN: Valarie Marquis MD  RETURN PHYSICIAN APPOINTMENT: 19       Pre-treatment Symptoms/Complaints: Says her knee is doing better. Just sore. Has been able to walk on it better. Walking stairs step-to with crutches vs scooting up while seated. Has been working on her HEP. CPM was returned recently. Pain: Initial:   3/10 Post Session: pain levels improved   Medications Last Reviewed:  19    Updated Objective Findings:   Observation: Walks into clinic with bilat crutches, PWB on L with improved L knee and LE mechanics. No more than mild swelling noted to L knee. Atrophy noted. ROM:       LLE AROM  L Knee Flexion: 95(in sitting)  L Knee Extension: -15(in sitting)  LLE PROM  L Knee Flexion: 105  L Knee Extension: -5                 Functional Mobility: Walking on level ground: using bilat crutches, PWB on L, step-through pattern. TREATMENT:     Manual Therapies: (15 Minutes):  Mobilizations for stiffness with respect to pain to L patellar inferior and superior glides, grade 3 to 4, 3x30\" each; physiologic mobilizations to extension in supine, grade 4- - to 4-, 4x30\"; physiologic flexion in sitting and supine, grade 2 to 4- - to 4-, 4x30\" each. Therapeutic Exercise: (35 Minutes): Knee and whole LE flexibility with assisted stretching to gastroc, posterior hip, hamstrings in 90/90 and SLR, 3\"x10 each. Instruction and performance in lumbopelvic mobility pelvic tilts and hip shifts in hook-lying for pre-gait motions, x10 each; and added hook-lying active ankle dorsiflexion for active knee flexion end range stretch. Quad activation with Neuromuscular Electrical Stimulation--2 lead/4 electrode step up to L quads, program 3 used with 12\" on/20\" off, intensity to strong sensation--with active quad setting in supine terminal extension, short arc quad, seated knee extension with contralat blue band resisted knee flexion, and standing terminal quad. Instruction and performance in normal stance control sways in frontal plane to 50% weight bearing on L LE to be done with HEP. HEP: She is to ice at home. She is to continue with her HEP for motion and quad activation. She is to add the sway exercises instructed in today. She verbalizes understanding. Treatment/Session Summary:    · Response to Treatment:  Making gains in knee ROM and quad action. Still quite stiff and weak. Good performance and effort of the exercises done today. Pain is under control through the session. · Communication/Consultation:  None today  · Equipment provided today:  None today  · Recommendations/Intent for next treatment session: Continue with knee motion; quad activation, including NMES; modalities as needed for pain and swelling as needed. Treatment Plan of Care Effective Dates: 7/3/2019 TO 10/1/2019 (90 days). Frequency/Duration: 3 times a week for 1 week to next MD follow up, then plan for 2-3 days per week for a total of 90 Day(s). Total Treatment Billable Duration: 50  Minutes.   PT Patient Time In/Time Out  Time In: 8175  Time Out: [address] Sandy, PT    Future Appointments   Date Time Provider Joshua Greenwood   7/30/2019  1:45 PM Papo Camacho [facility]   8/1/2019  1:45 PM Leatha Kent, DIANE [facility]

## 2019-07-30 ENCOUNTER — HOSPITAL ENCOUNTER (OUTPATIENT)
Dept: PHYSICAL THERAPY | Age: 34
Discharge: HOME | End: 2019-07-30
Payer: COMMERCIAL

## 2019-07-30 PROCEDURE — 97140 MANUAL THERAPY 1/> REGIONS: CPT

## 2019-07-30 PROCEDURE — 97016 VASOPNEUMATIC DEVICE THERAPY: CPT

## 2019-07-30 PROCEDURE — 97110 THERAPEUTIC EXERCISES: CPT

## 2019-07-30 NOTE — PROGRESS NOTES
Ruthie Dry  : 1985  Payor: Colt Infante / Plan: [address] / Product Type: PPO /  [address]  at [facility] JUAN MIGUEL SHERWOOD  [address] [facility], [address], [place] [address].  [phone]   [phone]       OUTPATIENT PHYSICAL THERAPY: Daily Treatment Note 2019  Visit Count: 7     ICD-10: Treatment Diagnosis: Pain in left knee (M25.562); Tear of articular cartilage of left knee, current, sequela (S83.32XS); Chondromalacia, left knee (K75.314)  Precautions/Allergies: post-op precautions as ordered. From EMR: Amoxicillin and Penicillin   MD Orders: Eval and Treat; eval and treat, HEIP, ROM, quad sets, ankle pukps, full progressive ROM, 50% partial WB (19) MEDICAL/REFERRING DIAGNOSIS:s/p L knee scope with open osteochondral allograft transfer to the medial femoral condyle and trochlea  L knee    DATE OF ONSET: 19  REFERRING PHYSICIAN: Alejandra Suarez MD  RETURN PHYSICIAN APPOINTMENT: 19       Pre-treatment Symptoms/Complaints: Says she sees Dr Kevon Alonso today. Knee doing well. No longer feels her leg may buckle. Requesting vaso pneumatic machine at session end. Pain: Initial: Pain Intensity 1: 0(current) 0/10 Post Session: pain levels improved   Medications Last Reviewed:  19    Updated Objective Findings:   Observation: Walks into clinic with bilat crutches, PWB on L with improved L knee and LE mechanics. No more than mild swelling noted to L knee. Atrophy noted. ROM:       LLE AROM  L Knee Flexion: 100  L Knee Extension: -10  LLE PROM  L Knee Flexion: 105  L Knee Extension: -5                 Functional Mobility: Walking on level ground: using bilat crutches, PWB on L, step-through pattern.       TREATMENT:     Manual Therapies: (20 Minutes):  --  myofascial release around scar, tibial tuberosity, distal quad and  iliotibial band with knee in flexed position  -- physiologic mobilizations to knee flexion and extension in supine, grade 4- - to 4-, 5x20\"  Therapeutic Exercise: (20 Minutes):   -- Knee and whole LE flexibility with assisted stretching to gastroc, posterior hip, hamstrings in 90/90 and SLR, 3\"x10 each.   --Quad activation with Neuromuscular Electrical Stimulation--2 lead/4 electrode step up to L quads, program 3 used with 12\" on/20\" off, intensity to strong sensation--with active quad setting in supine terminal extension with SLR,  seated knee extension with blue band resisted knee R knee extension, L knee flexion, and standing terminal quad. Modalities (10 min) vasopneumatic maching at 40 degrees, min resistance  HEP:  She is to continue with her HEP for motion and quad activation. She will do updated HEP. Treatment/Session Summary:    · Response to Treatment: able to do SLR independently today but with extensor lag. Tolerating exercises well. To see Dr. Raquel Landaverde today. · Communication/Consultation:  None today  · Equipment provided today:  None today  · Recommendations/Intent for next treatment session: Continue as per order after MD follow up visit and knee motion; quad activation, including NMES; modalities as needed for pain and swelling as needed. Treatment Plan of Care Effective Dates: 7/3/2019 TO 10/1/2019 (90 days). Frequency/Duration: 3 times a week for 1 week to next MD follow up, then plan for 2-3 days per week for a total of 90 Day(s). Total Treatment Billable Duration: 50  Minutes.   PT Patient Time In/Time Out  Time In: 1350  Time Out: [address] [facility], PT    Future Appointments   Date Time Provider Joshua Greenwood   8/1/2019  1:45 PM Isa Wilson, PT [facility]

## 2019-08-01 ENCOUNTER — HOSPITAL ENCOUNTER (OUTPATIENT)
Dept: PHYSICAL THERAPY | Age: 34
Discharge: HOME | End: 2019-08-01
Payer: COMMERCIAL

## 2019-08-01 PROCEDURE — 97110 THERAPEUTIC EXERCISES: CPT

## 2019-08-01 PROCEDURE — 97140 MANUAL THERAPY 1/> REGIONS: CPT

## 2019-08-01 NOTE — PROGRESS NOTES
Sebastián Guo  : 1985  Payor: Colt Infante / Plan: [address] / Product Type: PPO /  [address]  at [facility] JUAN MIGUEL SHERWOOD  [address] [facility], [address], [address] [facility]  [phone]   [phone]       OUTPATIENT PHYSICAL THERAPY: Daily Treatment Note 2019  Visit Count: 8     ICD-10: Treatment Diagnosis: Pain in left knee (M25.562); Tear of articular cartilage of left knee, current, sequela (S83.32XS); Chondromalacia, left knee (X60.023)  Precautions/Allergies: post-op precautions as ordered. From EMR: Amoxicillin and Penicillin   MD Orders: Eval and Treat; HEP, ROM, \"full motion, full strength, WBAT\" (19)  MEDICAL/REFERRING DIAGNOSIS:s/p L knee scope with open osteochondral allograft transfer to the medial femoral condyle and trochlea  L knee    DATE OF ONSET: 19  REFERRING PHYSICIAN: Tatyana Hay MD  RETURN PHYSICIAN APPOINTMENT: 19       Reports having her follow up with Dr. Tho Larios on Tuesday. He is progressing her to \"full\" motion and strength, and she can start coming off the crutches. He provided a new order to continue PT. She is to return in 3 weeks. Pre-treatment Symptoms/Complaints: Says her knee is sore, but doing \"better\". Pain: Initial:   0/10 Post Session: \"no pain\" post treaetment   Medications Last Reviewed: 19    Updated Objective Findings:   Observation: Walks into clinic with one crutch on L, lean to L. No more than mild swelling noted to L knee. Atrophy noted L quads. ROM:       LLE PROM  L Knee Flexion: 110  L Knee Extension: 0                 Functional Mobility: Walking on level ground: Able to walk with one crutch with step-through pattern, but limp and decreased stance time on L; hop step when on L stance with no crutch walking. TREATMENT:     Initiated exercise bike for motion warm up with 1/2-revolutions progressing to full x10' total.   Manual Therapies: (10 Minutes):  Myofascial scar release and scar mobilization for restrictions here. Mobilizations for stiffness with respect to pain to L patellar inferior and superior glides, grade 3 to 4, 3x30\" each; physiologic mobilizations to extension in supine, grade 4- - to 4-, 4x30\"; physiologic flexion in supine, grade 2 to 4- - to 4-, 4x30\" each. Therapeutic Exercise: (30 Minutes):Knee and whole LE flexibility with assisted stretching to soleus, gastroc, posterior hip, hamstrings in 90/90 and SLR, and quads in prone, 3\"x10 each. Lumbopelvic mobility pelvic tilts and hip shifts in hook-lying for pre-gait motions, x10 each. Closed chain exercises for muscle co-contraction and control with standing sways in normal stance, frontal plane with emphasis on L quad. Added pre-gait shifting in stride L and R with emphasis on each LE mechanics, quad/glut co-contractions on stance on L; progressing to single-stepping with use of one crutch. Verbal and visual cues for these, with instruction for HEP performance. Good return demonstration and understanding. Therapeutic Modalities (15 Minutes): Cold and compression for post treatment pain and swelling using [employer], low pressure, 40-deg F x15' while supine. No more than skin erythema post.   HEP:  She is to continue with her HEP for motion and quad activation, and to add the pre-gait control drills as done today. She verbalizes understanding. Treatment/Session Summary:    · Response to Treatment: Stiff to the knee, but making gains each week. Better quad control. Good start to progression to full weight bearing and decreasing use of crutches. Pain is under control. Has pain at end ranges with motion treatment and with the exercises and weight loading. But no significant pain reported at end of session.    · Communication/Consultation:  None today  · Equipment provided today:  None today  · Recommendations/Intent for next treatment session: Continue with knee motion; progressive quad strengthening, including NMES; progressing weight bearing and gait mechanics; and thermal modalities as needed for pain and swelling as needed. Treatment Plan of Care Effective Dates: 7/3/2019 TO 10/1/2019 (90 days). Frequency/Duration: 2-3 times a week for 3 week to next MD follow up. Total Treatment Billable Duration: 55  Minutes.   PT Patient Time In/Time Out  Time In: 1350  Time Out: 500 Alice Fiore PT    Future Appointments   Date Time Provider Joshua Greenwood   8/7/2019  2:30 PM DIANE [facility]   8/9/2019  9:00 AM Vic Gonsalez, PT [facility]   8/13/2019  4:15 PM Brandyn Scott PT [facility]   8/14/2019  4:00 PM Vic Gonsalez PT [facility]

## 2019-08-01 NOTE — PROGRESS NOTES
Angy Parents  : 1985  Primary: [facility]  Secondary:  [address]  at [facility] JUAN MIGUEL SHERWOOD  [address] [facility], [address], [place] [address].  [phone]   [phone]       OUTPATIENT PHYSICAL THERAPY:Progress Report 2019     ICD-10: Treatment Diagnosis: Pain in left knee (M25.562); Tear of articular cartilage of left knee, current, sequela (S83.32XS); Chondromalacia, left knee (U21.134)  Precautions/Allergies: post-op precautions as ordered. From EMR: Amoxicillin and Penicillin   MD Orders: Eval and Treat; HEP; ROM: \"quad sets, ankle pumps, TTWB\" (19) MEDICAL/REFERRING DIAGNOSIS:s/p L knee scope with open osteochondral allograft transfer to the medial femoral condyle and trochlea  L knee    DATE OF ONSET: 19  REFERRING PHYSICIAN: Geovanna Weber MD  RETURN PHYSICIAN APPOINTMENT: 19     PROGRESS ASSESSMENT:  Ms. Pat Cruz has attended 8 treatments to date. She is now 6 weeks s/p L knee scope with removal of loose bodies, open osteochondral allograft transfer to the medial femoral condyle and trochlea. She is making good progress. Pain is under control. The knee ROM is still stiff, but steadily progressing. Quad activation is still quite weak, but again improving. She just progressed to [facility] on the L with walking. She is still requires one crutch for improved alignment and mechanics when walking short distances. She is progressing toward her goals. She will benefit from continued PT as per her existing treatment plan. PROBLEM LIST (Impacting functional limitations):  1. Post-op pain and swelling L knee  2. Decreased ROM L knee   3. Decreased functional strength L knee/LE   4. Decreased gait skills INTERVENTIONS PLANNED: (Treatment may consist of any combination of the following)  1. Thermal and electric modalities, manual therapies for pain. 2. Manual therapies and therapeutic exercises for ROM and strength.    3. Gait Training, Therapeutic exercises for gait and balance   TREATMENT PLAN:  Effective Dates: 7/3/2019 TO 10/1/2019 (90 days). Frequency/Duration: 3 times a week for 1 week to next MD follow up, then plan for 2-3 days per week for a total of 90 Day(s)  GOALS: (Goals have been discussed and agreed upon with patient.)  Short-Term Functional Goals: Time Frame: 6 weeks  1. Report no more than 5/10 intermittent pain L knee with basic walking and ADL's, and score greater than 30/80 on the LEFS. Progressing, ongoing 8/1/19  2. L knee PROM is greater than or equal to 0-125 degrees flexion. Progressing, ongoing 8/1/19  3. L quad strength is 5/5 with good terminal extension strength for stability with walking and progressing into strengthening phase. Progressing, ongoing 8/1/19  4. Walking with crutches and appropriate weight bearing status with good mechanics/technique on level surfaces and up/down stairs. Progressing, ongoing 8/1/19  Discharge Goals: Time Frame: 12 weeks  1. No more than 3/10 intermittent pain L knee with all normalized daily home and work activities, and score greater than 50/80 on the LEFS. 2. Demonstrate L knee ROM equal to the R for range to return to normalized daily home, work, and exercise activities. 3. Demonstrate good functional knee strength with stair walking. 4. Able to balance with good control in single-leg stand greater than 30 seconds with eyes open, greater than 10 seconds with eyes closed for improved dynamic stability. 5. Independent with [facility] for advanced knee strengthening. Updated Objective Findings:   Observation: Walks into clinic with one crutch on L, lean to L. No more than mild swelling noted to L knee. Atrophy noted L quads. ROM:   LLE PROM  L Knee Flexion: 110  L Knee Extension: 0              Functional Mobility: Walking on level ground: Able to walk with one crutch with step-through pattern, but limp and decreased stance time on L; hop step when on L stance with no crutch walking.     MEDICAL NECESSITY: · Patient is expected to demonstrate progress in strength, range of motion and balance to return to normalized basic mobility and her normal activity level. · Current impairments are limiting her basic mobility skills. REASON FOR SERVICES/OTHER COMMENTS:  · Patient continues to require skilled intervention due to the complexity of the surgical procedure and need for safe, progressive rehab to promote rethrn to normalized mobility and activity level.     Сергей Chen, PT, MSPT, OCS

## 2019-08-07 ENCOUNTER — HOSPITAL ENCOUNTER (OUTPATIENT)
Dept: PHYSICAL THERAPY | Age: 34
Discharge: HOME | End: 2019-08-07
Payer: COMMERCIAL

## 2019-08-07 PROCEDURE — 97140 MANUAL THERAPY 1/> REGIONS: CPT

## 2019-08-07 PROCEDURE — 97110 THERAPEUTIC EXERCISES: CPT

## 2019-08-09 ENCOUNTER — HOSPITAL ENCOUNTER (OUTPATIENT)
Dept: PHYSICAL THERAPY | Age: 34
Discharge: HOME | End: 2019-08-09
Payer: COMMERCIAL

## 2019-08-09 PROCEDURE — 97110 THERAPEUTIC EXERCISES: CPT

## 2019-08-09 PROCEDURE — 97140 MANUAL THERAPY 1/> REGIONS: CPT

## 2019-08-13 ENCOUNTER — HOSPITAL ENCOUNTER (OUTPATIENT)
Dept: PHYSICAL THERAPY | Age: 34
Discharge: HOME | End: 2019-08-13
Payer: COMMERCIAL

## 2019-08-13 PROCEDURE — 97110 THERAPEUTIC EXERCISES: CPT

## 2019-08-13 NOTE — PROGRESS NOTES
Efrem Katz  : 1985  Payor: Colt Infante / Plan: [id number] / Product Type: PPO /  [address] at [facility] JUAN MIGUEL SHERWOOD  [address] [facility], [address], [place] [address].  [phone]   [phone]       OUTPATIENT PHYSICAL THERAPY: Daily Treatment Note 2019  Visit Count: 11     ICD-10: Treatment Diagnosis: Pain in left knee (M25.562); Tear of articular cartilage of left knee, current, sequela (S83.32XS); Chondromalacia, left knee (V87.070)  Precautions/Allergies: post-op precautions as ordered. 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 Says she moved desks and chair around today. Classroom is done, so does not have to do this much work the next week or so. Pain: Initial:   painful L knee. Post Session: \"no pain\" post treaetment   Medications Last Reviewed: 19    Updated Objective Findings:   Observation: Walks with with bilat crutches, PWB on L. Mild swelling noted to L knee. ROM: L Knee PROM: 2-0-115     TREATMENT:     Exercise bike for motion warm up, full x10' on own. Therapeutic Exercise: (30 Minutes): Quad activation with Neuromuscular Electrical Stimulation--2 lead/4 electrode step up to L quads, program 3 used with 12\" on/20\" off, intensity to strong sensation, x20 minutes total--with active quad setting in supine terminal extension, short arc quad with 2#, and seated knee extension with 2#. Added standing knee flexion with 2# 3x10.  Shuttle Press with bilat press at 37# x15; bilat to unilat crossed extension hold at 37# 1x10 and 50# 3x10. Manual Therapies: (5 Minutes): Mobilizations for stiffness with physiologic mobilizations to extension in supine, grade 4- - to 4-, 3x30\"; physiologic flexion in supine, grade 2 to 4- - to 4-, 3x30\" each. Therapeutic Modalities (10 Minutes): Cold and compression for post treatment pain and swelling using Game Ready, low pressure, 40-deg F x10' while in long sitting. No more than skin erythema post.  HEP:  She is to continue with her current HEP. She verbalizes understanding. Treatment/Session Summary:    · Response to Treatment: Started with pain to the knee secondary to work activities today. Good effort and performance of the exercises today. Pain to the knee limits her. · Communication/Consultation:  None today  · Equipment provided today:  None today  · Recommendations/Intent for next treatment session: Continue with knee motion; progressive quad strengthening, including NMES; progressing weight bearing and gait mechanics; and thermal modalities as needed for pain and swelling as needed. She returns to work next week. Treatment Plan of Care Effective Dates: 7/3/2019 TO 10/1/2019 (90 days). Frequency/Duration: 2-3 times a week for 3 week to next MD follow up. Total Treatment Billable Duration: 45 Minutes.   PT Patient Time In/Time Out  Time In: 1520  Time Out: [facility], PT    Future Appointments   Date Time Provider Joshua Greenwood   8/14/2019  4:00 PM Gunnar Winchester, PT [facility]   8/20/2019  4:00 PM Gunnar Winchester, PT [facility]   8/22/2019  4:00 PM Gunnar Winchester, PT [facility]   8/27/2019  4:00 PM Gunnar Winchester, PT [facility]   8/29/2019  4:00 PM Gunnar Winchester, PT [facility]   9/3/2019  4:00 PM Gunnar Winchester, PT [facility]   9/5/2019  4:00 PM Gunnar Winchester, PT [facility]   9/9/2019  4:00 PM Gunnar Winchester, PT [facility] 9/11/2019  4:00 PM Shazia Valderrama PT [facility]

## 2019-08-14 ENCOUNTER — HOSPITAL ENCOUNTER (OUTPATIENT)
Dept: PHYSICAL THERAPY | Age: 34
Discharge: HOME | End: 2019-08-14
Payer: COMMERCIAL

## 2019-08-14 PROCEDURE — 97110 THERAPEUTIC EXERCISES: CPT

## 2019-08-14 NOTE — PROGRESS NOTES
Hafsa Españamadeleine  : 1985  Payor: Colt Infante / Plan: [id number] / Product Type: PPO /  [address]  at [facility] JUAN MIGUEL SHERWOOD  [address] [facility], [address], [address] [facility]  [phone]   [phone]       OUTPATIENT PHYSICAL THERAPY: Daily Treatment Note 2019  Visit Count: 12     ICD-10: Treatment Diagnosis: Pain in left knee (M25.562); Tear of articular cartilage of left knee, current, sequela (S83.32XS); Chondromalacia, left knee (T49.209)  Precautions/Allergies: post-op precautions as ordered. From EMR: Amoxicillin and Penicillin   MD Orders: Eval and Treat; HEP, ROM, \"full motion, full strength, WBAT\" (19)  MEDICAL/REFERRING DIAGNOSIS:s/p L knee scope with open osteochondral allograft transfer to the medial femoral condyle and trochlea  L knee    DATE OF ONSET: 19  REFERRING PHYSICIAN: Mariusz Thao MD  RETURN PHYSICIAN APPOINTMENT: 19       Pre-treatment Symptoms/Complaints: Says she mostly did desk work today. Not as much on her feet activity as yesterday. Knee is ok. Has been in the brace all day. Pain: Initial:   No VAS. Post Session: \"no pain\" post treaetment   Medications Last Reviewed: 19    Updated Objective Findings:   Observation: Walks in with one crutch. Mild swelling noted to L knee. ROM: L Knee PROM: 2-0-120     TREATMENT:     Exercise bike for motion warm up, full x10' on own. Manual Therapies: (5 Minutes): Mobilizations for stiffness with physiologic mobilizations to extension in supine, grade 4- - to 4-, 3x30\"; physiologic flexion in supine, grade 2 to 4- - to 4-, 3x30\" each  Therapeutic Exercise: (40 Minutes): Exercises for knee and whole LE flexibility with assisted Active Isolated Stretching to soleus, gastroc., posterior hip, lateral hip, hamstrings in 90/90 and SLR, and hip flexors and quads in prone and side-lying, 3\"x10 each.   Exercises for active knee and whole LE muscle activation and active motion with straight side-lying bent knee hip pull back/hip IR x15; side-lying bent knee hip abd/ER x15; side-lying straight knee hip abd 1x15 and hip add 1x15; prone knee flexion/hip extension 1x10; hook-lying pelvic tilts x10, hip shifts x10, and hip shift/contralat hip flexion 3x5 each progressing to bridge/hip shift/contral hip flex 2x5 each. Instruction for the latter to be done as part of HEP. Therapeutic Modalities (15 Minutes): Cold and compression for post treatment pain and swelling using Game Ready, low pressure, 40-deg F x15' while in long sitting. No more than skin erythema post.  HEP:  She is to continue with her current HEP,and to add the hook-lying bridge. She verbalizes understanding. Treatment/Session Summary:    · Response to Treatment: Good effort and performance of the exercises today. Knee ROM is a little better. Worked on whole on downgraded whole lower quarter stance muscle chain prep. · Communication/Consultation:  None today  · Equipment provided today:  None today  · Recommendations/Intent for next treatment session: Continue with knee motion; progressive quad strengthening, including NMES; progressing weight bearing and gait mechanics; and thermal modalities as needed for pain and swelling as needed. Treatment Plan of Care Effective Dates: 7/3/2019 TO 10/1/2019 (90 days). Frequency/Duration: 2-3 times a week for 3 week to next MD follow up. Total Treatment Billable Duration: 60 Minutes.   PT Patient Time In/Time Out  Time In: 1550  Time Out: DIANE Wagner    Future Appointments   Date Time Provider Joshua Greenwood   8/20/2019  4:00 PM Ashok Mckenzie [facility]   8/22/2019  4:00 PM [facility]   8/27/2019  4:00 PM [facility]   8/29/2019  4:00 PM [facility]   9/3/2019  4:00 PM Ashok Mckenzie, PT [facility]   9/5/2019  4:00 PM Jono Zee Lucía North, PT [facility]   9/9/2019  4:00 PM Ed Apo, [facility]   9/11/2019  4:00 PM Ed Apo, PT [facility]

## 2019-08-20 ENCOUNTER — HOSPITAL ENCOUNTER (OUTPATIENT)
Dept: PHYSICAL THERAPY | Age: 34
Discharge: HOME | End: 2019-08-20
Payer: COMMERCIAL

## 2019-08-20 PROCEDURE — 97110 THERAPEUTIC EXERCISES: CPT

## 2019-08-20 NOTE — PROGRESS NOTES
Bailey Hess  : 1985  Payor: Colt Infante / Plan: [id number] / Product Type: PPO /  [address]  at [facility] JUAN MIGUEL SHERWOOD  [address] [facility], [address], [place] [address].  [phone]   [phone]       OUTPATIENT PHYSICAL THERAPY: Daily Treatment Note 2019  Visit Count: 13     ICD-10: Treatment Diagnosis: Pain in left knee (M25.562); Tear of articular cartilage of left knee, current, sequela (S83.32XS); Chondromalacia, left knee (E72.123)  Precautions/Allergies: post-op precautions as ordered. From EMR: Amoxicillin and Penicillin   MD Orders: Eval and Treat; HEP, ROM, \"full motion, full strength, WBAT\" (19)  MEDICAL/REFERRING DIAGNOSIS:s/p L knee scope with open osteochondral allograft transfer to the medial femoral condyle and trochlea  L knee    DATE OF ONSET: 19  REFERRING PHYSICIAN: Kena Garza MD  RETURN PHYSICIAN APPOINTMENT: 10/8/19       Reports having a follow up with Dr. Teresa Apgar today. He told her her motion is looking good. She just needs to work on quad strength. She was provided a new order to continue PT. She is to progress off the crutch. She is to return for follow up in 5-6 weeks. 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 Yesterday, she stood through her classes, but able to sit more today. Knee is very sore. Continues to use the one crutch to walk still. Went to a baseball game in [place] on Saturday and felt she did very well with the drive, walking in the stadium, and sitting in the stadium seats. Knee was sore with all this. Pain: Initial:   No VAS. Post Session: \"no pain\" post treaetment   Medications Last Reviewed: 19    Updated Objective Findings:   Observation: Walks in with one crutch. Mild swelling noted to L knee. ROM: L Knee PROM: 2-0-125 after motion treatment. TREATMENT:     Exercise bike for motion warm up, full x10' on own.    Manual Therapies: (5 Minutes): Mobilizations for stiffness with physiologic mobilizations to extension in supine, grade 4- - to 4-, 3x30\"; physiologic flexion in supine, grade 2 to 4- - to 4-, 3x30\" each  Therapeutic Exercise: (45 Minutes): Exercises for knee and whole LE flexibility with assisted Active Isolated Stretching to soleus, gastroc., posterior hip, lateral hip, hamstrings in 90/90 and SLR, and hip flexors and quads in prone and side-lying, 3\"x10 each. Quad activation with Neuromuscular Electrical Stimulation--2 lead/4 electrode step up to L quads, program 3 used with 12\" on/20\" off, intensity to strong sensation, x20 minutes total--with active quad setting in supine terminal extension, short arc quad, and seated knee extension. Exercises for knee strengthening with seated knee extension with 5# 3x10; machine resisted knee flexion with bilat at 25# x15, unilat at 15# 3x10 each; and Shuttle Press with bilat press at 50# x15, and unilat at 25# 2x10. HEP:  Provided ice for her knee post treatment. She is to continue with her current HEP. She verbalizes understanding. Treatment/Session Summary:    · Response to Treatment:  She is now 2 months post op. Knee ROM is progress steadily. Weakness to the knee as expected. There is crepitus and pain noted to the patellofemoral joint with knee flexion/extenion motions. Good effort and performance of the exercises today. Pain is increased overall with return to work this week. · Communication/Consultation:  None today  · Equipment provided today:  None today  · Recommendations/Intent for next treatment session: Continue with knee motion; progressive quad strengthening, including NMES; progressing weight bearing and gait mechanics; and thermal modalities as needed for pain and swelling as needed. Treatment Plan of Care Effective Dates: 7/3/2019 TO 10/1/2019 (90 days). Frequency/Duration: 2-3 times a week for 3 week to next MD follow up.         Total Treatment Billable Duration: 50 Minutes.   PT Patient Time In/Time Out  Time In: 1600  Time Out: [address], PT    Future Appointments   Date Time Provider Joshua Greenwood   8/22/2019  4:00 PM Lesley Meyer, PT [facility]   8/27/2019  4:00 PM [facility]   8/29/2019  4:00 PM [facility] Meyer, PT [facility]   9/3/2019  4:00 PM Lesley Meyer, PT [facility]   9/5/2019  4:00 PM Lesley Meyer, PT [facility]   9/9/2019  4:00 PM Lesley Meyer, PT [facility]   9/11/2019  4:00 PM Lesley Meyer, PT [facility]

## 2019-08-22 ENCOUNTER — HOSPITAL ENCOUNTER (OUTPATIENT)
Dept: PHYSICAL THERAPY | Age: 34
Discharge: HOME | End: 2019-08-22
Payer: COMMERCIAL

## 2019-08-22 PROCEDURE — 97110 THERAPEUTIC EXERCISES: CPT

## 2019-08-22 NOTE — PROGRESS NOTES
Hermes Jasso  : 1985  Payor: 550Ayaka Infante / Plan: [address] / Product Type: PPO /  [address] at [facility] JUAN MIGUEL SHERWOOD  [address] [facility], [address], [address] [facility]  [phone]   [phone]       OUTPATIENT PHYSICAL THERAPY: Daily Treatment Note 2019  Visit Count: 14     ICD-10: Treatment Diagnosis: Pain in left knee (M25.562); Tear of articular cartilage of left knee, current, sequela (S83.32XS); Chondromalacia, left knee (R90.521)  Precautions/Allergies: post-op precautions as ordered. From EMR: Amoxicillin and Penicillin   MD Orders: Eval and Treat; HEP, ROM, \"full motion, full strength, WBAT\" (19)  MEDICAL/REFERRING DIAGNOSIS:s/p L knee scope with open osteochondral allograft transfer to the medial femoral condyle and trochlea  L knee    DATE OF ONSET: 19  REFERRING PHYSICIAN: Mimi Aguilar MD  RETURN PHYSICIAN APPOINTMENT: 10/8/19        Pre-treatment Symptoms/Complaints: Sore and tired from school day. Forgot her knee brace this morning. 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 Pain: Initial:   No VAS. Post Session: \"no pain\" post treaetment   Medications Last Reviewed: 19    Updated Objective Findings:   No new measure. TREATMENT:     Exercise bike for motion warm up, full x10' on own. Manual Therapies: (6 Minutes): Mobilizations to L knee for stiffness with patellar inferior glide, grade 3 to 4 3x20-30\"; physiologic mobilizations to extension in supine, grade 4- - to 4-, 3x30\"; physiologic flexion in supine, grade 2 to 4- - to 4-, 3x30\" each. Kinesio tape applied to L patella for medial and lateral mechanical support, and knee joint mechanical support U-strip. Instruction in tape wear and removal if skin is irritated. She verbalizes understanding.   Therapeutic Exercise: (40 Minutes): Exercises for knee and whole LE flexibility with assisted Active Isolated Stretching to soleus, gastroc., posterior hip, lateral hip, hamstrings in 90/90 and SLR, and hip flexors and quads in prone and side-lying, 3\"x10 each. Exercises for quad strengthening with seated knee extension with 5# x30 total.   Exercises for static balance and control with narrow stance firm/eyes open x30\", eyes closed x30\", foam eyes open x30\", eyes closed 3x10-20\"; tandem stance firm/eyes open 2x30\" each, foam close stride 1x20-30\" each. Dynamic balance with 10-in ery step-over 3x5 each; Rocker Board in UE supported to unsupported double leg stance with frontal plane static holds and control shifts, x3 each. Therapeutic Modalities (10 Minutes): Cold and compression for post treatment pain and swelling using Game Ready, low pressure, 40-deg F x10' while in long sitting. No more than skin erythema post.  HEP:  She is to continue with her current HEP. She verbalizes understanding. Treatment/Session Summary:    · Response to Treatment:  Knee ROM is status quo. Improving walking control and speed without crutch when on level ground. Good effort and performance of the exercises done today. L knee is sore after, and continues to have crepitus noted to it. · Communication/Consultation:  None today  · Equipment provided today:  None today  · Recommendations/Intent for next treatment session: Continue with knee motion; progressive quad strengthening, progressing weight bearing and gait mechanics; and thermal modalities as needed for pain and swelling as needed. Treatment Plan of Care Effective Dates: 7/3/2019 TO 10/1/2019 (90 days). Frequency/Duration: 2-3 times a week for 3 week to next MD follow up. Total Treatment Billable Duration: 56 Minutes.   PT Patient Time In/Time Out  Time In: 1600  Time Out: [address], PT    Future Appointments   Date Time Provider Joshua Greenwood   8/27/2019  4:00 PM Papo Huffman [facility]   8/29/2019  4:00 PM Chana Ramírez PT [facility]   9/3/2019  4:00 PM Ashok Mckenzie, PT [facility]   9/5/2019  4:00 PM Ashok Mckenzie, PT [facility]   9/9/2019  4:00 PM Ashok Mckenzie, PT [facility]   9/11/2019  4:00 PM Ashok Mckenzie, PT [facility]

## 2019-08-27 ENCOUNTER — HOSPITAL ENCOUNTER (OUTPATIENT)
Dept: PHYSICAL THERAPY | Age: 34
Discharge: HOME | End: 2019-08-27
Payer: COMMERCIAL

## 2019-08-27 PROCEDURE — 97140 MANUAL THERAPY 1/> REGIONS: CPT

## 2019-08-27 PROCEDURE — 97110 THERAPEUTIC EXERCISES: CPT

## 2019-08-27 NOTE — PROGRESS NOTES
Uriel Luke  : 1985  Payor: Colt Infante / Plan: [address] / Product Type: PPO /  [address] at [facility] JUAN MIGUEL SHERWOOD  [address] [facility], [address], [place] [address].  [phone]   [phone]       OUTPATIENT PHYSICAL THERAPY: Daily Treatment Note 2019  Visit Count: 15     ICD-10: Treatment Diagnosis: Pain in left knee (M25.562); Tear of articular cartilage of left knee, current, sequela (S83.32XS); Chondromalacia, left knee (Y21.870)  Precautions/Allergies: post-op precautions as ordered. From EMR: Amoxicillin and Penicillin   MD Orders: Eval and Treat; HEP, ROM, \"full motion, full strength, WBAT\" (19)  MEDICAL/REFERRING DIAGNOSIS:s/p L knee scope with open osteochondral allograft transfer to the medial femoral condyle and trochlea  L knee    DATE OF ONSET: 19  REFERRING PHYSICIAN: Clare Devine MD  RETURN PHYSICIAN APPOINTMENT: 10/8/19        Pre-treatment Symptoms/Complaints: Says she has been walking without the crutches the past few days. 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 Feels good, stable when wearing the brace. Today she had lunch duty, so was on her feet 35-40 minutes. Sore after this. Says the Kinesio tape was helpful. Pain: Initial:   \"just sore\". Post Session: Mild increase reported. Medications Last Reviewed: 19    Updated Objective Findings:   ROM: L Knee PROM: 3-0-125 after motion treatment. Strength: L Terminal quad: 3-  Functional Mobility: Walking on level ground: functional brace on L knee, no crutches, mild limp on L.      TREATMENT:     Exercise bike for motion warm up, full x10' on own. Manual Therapies: (15 Minutes): Brief myofascial scar release for scar restriction.  Mobilizations to L knee for stiffness with patellar inferior glide, grade 3 to 4 3x20-30\"; physiologic mobilizations to extension in supine, grade 4- - to 4-, 3x30\"; physiologic flexion in supine, grade 2 to 4- - to 4-, 3x30\" each.   Kinesio tape applied to L patella for medial and lateral mechanical support, and knee joint mechanical support U-strip. Instruction in tape wear and removal if skin is irritated. She verbalizes understanding. Therapeutic Exercise: (35 Minutes): Exercises for knee and whole LE flexibility with assisted Active Isolated Stretching to soleus, gastroc., posterior hip, lateral hip, hamstrings in 90/90 and SLR, and hip flexors and quads in prone and side-lying, 3\"x10 each. Exercises for quad activation and strengthening using Neuromuscular Electrical Stimulation--2 lead/4 electrode step up to L quads, program 3 used with 12\" on/20\" off, intensity to strong sensation--with active terminal knee extension x10, seated knee extension with 5# x10, and standing terminal knee ext with red band x3, blue x5. Instruction and performance in crossed extension wall drill and standing step to a stair step to work on lower quarter extension moment in stance. Good return demonstration and understanding for HEP. HEP:  She is to continue with her current HEP, and add the crossed extension wall drill and step to drill done today. She verbalizes understanding. Treatment/Session Summary:    · Response to Treatment:  Good effort and performance of the exercises done today. Walking skills are progressing. Improving quad action and stabilization in stance. · Communication/Consultation:  None today  · Equipment provided today:  None today  · Recommendations/Intent for next treatment session: Continue with knee motion; progressive quad strengthening, progressing weight bearing and gait mechanics; and thermal modalities as needed for pain and swelling as needed. Treatment Plan of Care Effective Dates: 7/3/2019 TO 10/1/2019 (90 days). Frequency/Duration: 2-3 times a week for 3 week to next MD follow up. Total Treatment Billable Duration: 50 Minutes.   PT Patient Time In/Time Out  Time In: 1608  Time Out: Uma Sanchez [facility] [facility], PT    Future Appointments   Date Time Provider Joshua Greenwood   8/29/2019  4:00 PM [facility]   9/3/2019  4:00 PM Rolando Beebe, [facility]   9/5/2019  4:00 PM DIANE Tavares [facility]   9/9/2019  4:00 PM DIANE Tavares [facility]   9/11/2019  4:00 PM [facility]

## 2019-08-29 ENCOUNTER — HOSPITAL ENCOUNTER (OUTPATIENT)
Dept: PHYSICAL THERAPY | Age: 34
Discharge: HOME | End: 2019-08-29
Payer: COMMERCIAL

## 2019-08-29 PROCEDURE — 97110 THERAPEUTIC EXERCISES: CPT

## 2019-08-29 NOTE — PROGRESS NOTES
Aaron Puckett  : 1985  Payor: Colt Infante / Plan: [address] / Product Type: PPO /  [address] at [facility] JUAN MIGUEL SHERWOOD  [address] [facility], [address], [place] [address].  [phone]   [phone]       OUTPATIENT PHYSICAL THERAPY: Daily Treatment Note 2019  Visit Count: 16     ICD-10: Treatment Diagnosis: Pain in left knee (M25.562); Tear of articular cartilage of left knee, current, sequela (S83.32XS); Chondromalacia, left knee (L07.124)  Precautions/Allergies: post-op precautions as ordered. From EMR: Amoxicillin and Penicillin   MD Orders: Eval and Treat; HEP, ROM, \"full motion, full strength, WBAT\" (19)  MEDICAL/REFERRING DIAGNOSIS:s/p L knee scope with open osteochondral allograft transfer to the medial femoral condyle and trochlea  L knee    DATE OF ONSET: 19  REFERRING PHYSICIAN: Mariela Malagon MD  RETURN PHYSICIAN APPOINTMENT: 10/8/19        Pre-treatment Symptoms/Complaints: Says her knee is sore. Tape is still on. Pain: Initial:   No VAS. Post Session: Mild increase reported. Medications Last Reviewed: 19    Updated Objective Findings:   No new measure. TREATMENT:     Exercise bike for motion warm up, full x10' on own. Manual Therapies: (5 Minutes): Mobilizations to L knee for stiffness with physiologic mobilizations to extension in supine, grade 4- - to 4-, 3x30\"; physiologic flexion in supine, grade 2 to 4- - to 4-, 3x30\". Therapeutic Exercise: (40 Minutes): Exercises for knee and whole LE flexibility with assisted Active Isolated Stretching to soleus, gastroc., posterior hip, lateral hip, hamstrings in 90/90 and SLR, and hip flexors and quads in prone and side-lying, 3\"x10 each.   L lower quarter muscle chain facilitation with myokinematic 90/90 hip lift/hip shift L x5 breath static hold and with R hip flexion/ext 3x10 rep; standing back to wall with L hip shift/R UE reach 3x 5 breath hold; and sit to stand from table, hips >Knees, equal weight shift 2x5; and stance control with step-to 4-in, 6-in, and 8-in steps, 2x5 each. HEP:  She is to continue with her current HEP. She verbalizes understanding. Treatment/Session Summary:    · Response to Treatment: Good effort and performance of the exercises done today. Improving stance stabilization L knee and whole LE. Still pain weakness at knee. Palpable, audible crepitus to L patellofemoral joint noted. Walking quality is improving. · Communication/Consultation:  None today  · Equipment provided today:  None today  · Recommendations/Intent for next treatment session: Continue with knee motion; progressive quad strengthening, progressing weight bearing and gait mechanics; and thermal modalities as needed for pain and swelling as needed. Treatment Plan of Care Effective Dates: 7/3/2019 TO 10/1/2019 (90 days). Frequency/Duration: 2-3 times a week for 3 week to next MD follow up. Total Treatment Billable Duration: 45 Minutes.   PT Patient Time In/Time Out  Time In: 6491  Time Out: [address], PT    Future Appointments   Date Time Provider Joshua Greenwood   9/3/2019  4:00 PM Leatha Kent PT [facility]   9/5/2019  4:00 PM DIANE [facility]   9/9/2019  4:00 PM DIANE [facility]   9/11/2019  4:00 PM DIANE [facility]

## 2019-09-03 ENCOUNTER — HOSPITAL ENCOUNTER (OUTPATIENT)
Dept: PHYSICAL THERAPY | Age: 34
Discharge: HOME | End: 2019-09-03
Payer: COMMERCIAL

## 2019-09-03 PROCEDURE — 97110 THERAPEUTIC EXERCISES: CPT

## 2019-09-03 NOTE — PROGRESS NOTES
Shiloh Tomlinson  : 1985  Payor: Colt Infante / Plan: [address] / Product Type: PPO /  [address] at [facility] JUAN MIGUEL SHERWOOD  [address] [facility], [address], [place] [address].  [phone]   [phone]       OUTPATIENT PHYSICAL THERAPY: Daily Treatment Note 9/3/2019  Visit Count: 17     ICD-10: Treatment Diagnosis: Pain in left knee (M25.562); Tear of articular cartilage of left knee, current, sequela (S83.32XS); Chondromalacia, left knee (J47.563)  Precautions/Allergies: post-op precautions as ordered. From EMR: Amoxicillin and Penicillin   MD Orders: Eval and Treat; HEP, ROM, \"full motion, full strength, WBAT\" (19)  MEDICAL/REFERRING DIAGNOSIS:s/p L knee scope with open osteochondral allograft transfer to the medial femoral condyle and trochlea  L knee    DATE OF ONSET: 19  REFERRING PHYSICIAN: Micheline Koo MD  RETURN PHYSICIAN APPOINTMENT: 10/8/19        Pre-treatment Symptoms/Complaints: Says she has been doing better with walking and less pain. Not using the crutches as much through the weekend. Had a \"weird\" pain to the L medial thigh yesterday. This went away and did not return today. Was on her feet a lot today at school. Pain: Initial:   No VAS. Post Session: Mild increase reported. Medications Last Reviewed: 9/3/19    Updated Objective Findings:   ROM: L Knee PROM: 3-0-125     TREATMENT:     Exercise bike for motion warm up, full x10', rpm=60 on own. Manual Therapies: (5 Minutes): Mobilizations to L knee for stiffness with physiologic mobilizations to extension in supine, grade 4- - to 4-, 3x30\"; physiologic flexion in supine, grade 2 to 4- - to 4-, 3x30\". Therapeutic Exercise: (40 Minutes): Exercises for knee and whole LE flexibility with assisted Active Isolated Stretching to soleus, gastroc., posterior hip, lateral hip, hamstrings in 90/90 and SLR, and hip flexors and quads in prone, side-lying, and Tramaine positions, 3\"x10 each.   Exercises for knee strength and control as per grid. Exercises modified as indicated. HEP:  She is to continue with her current HEP. Advised in self knee flexion stretch in long sitting or sitting with long holds. She verbalizes understanding. Date:  9-3-19 Date:   Date:     Activity/Exercise Parameters Parameters Parameters   bike L. 1 x10', rpm=60     ROM/Flexibiltiy Assisted as above     Balance/Control:   Dynamic:  10-in Armen Step Over  3x5 eac      Strength:   Knee extension 5# x15   6# x15  7# x15     Knee flexion Prone  5# x15  6# x15  7# x15     Hip Abd Side-lying 2x15 ea     Bridge Double leg  1x15;  Double to single leg 3x5 ea       Treatment/Session Summary:    · Response to Treatment: Good effort and performance of the exercises done today. She is 2 1/2 months post op. · Communication/Consultation:  None today  · Equipment provided today:  None today  · Recommendations/Intent for next treatment session: Continue with knee motion; progressive quad strengthening, progressing weight bearing and gait mechanics; and thermal modalities as needed for pain and swelling as needed. Treatment Plan of Care Effective Dates: 7/3/2019 TO 10/1/2019 (90 days). Frequency/Duration: 2-3 times a week for 3 week to next MD follow up. Total Treatment Billable Duration: 45 Minutes.   PT Patient Time In/Time Out  Time In: 1600  Time Out: DIANE Wagner    Future Appointments   Date Time Provider Joshua Greenwood   9/5/2019  4:00 PM Rupert Vu PT [facility]   9/9/2019  4:00 PM Rupert Vu PT [facility]   9/11/2019  4:00 PM Rupert Vu PT [facility]

## 2019-09-05 ENCOUNTER — HOSPITAL ENCOUNTER (OUTPATIENT)
Dept: PHYSICAL THERAPY | Age: 34
Discharge: HOME | End: 2019-09-05
Payer: COMMERCIAL

## 2019-09-05 PROCEDURE — 97110 THERAPEUTIC EXERCISES: CPT

## 2019-09-05 PROCEDURE — 97140 MANUAL THERAPY 1/> REGIONS: CPT

## 2019-09-05 NOTE — PROGRESS NOTES
Allison Ellison  : 1985  Primary: Ana Grullon [facility]  Secondary:  [address]  at [facility] JUAN MIGUEL SHERWOOD  [address] [facility], [address], [place] [address].  [phone]   [phone]       OUTPATIENT PHYSICAL THERAPY:Progress Report 2019     ICD-10: Treatment Diagnosis: Pain in left knee (M25.562); Tear of articular cartilage of left knee, current, sequela (S83.32XS); Chondromalacia, left knee (K54.007)  Precautions/Allergies: post-op precautions as ordered. From EMR: Amoxicillin and Penicillin   MD Orders: Eval and Treat; HEP, ROM, \"full motion, full strength, WBAT\" (19) MEDICAL/REFERRING DIAGNOSIS:s/p L knee scope with open osteochondral allograft transfer to the medial femoral condyle and trochlea  L knee    DATE OF ONSET: 19  REFERRING PHYSICIAN: Phoenix Lo MD  RETURN PHYSICIAN APPOINTMENT: 10/8/19     PROGRESS ASSESSMENT:  Ms. Kitty Diane has attended 18 treatments to date. She is now 2 1/2 months s/p L knee scope with removal of loose bodies, open osteochondral allograft transfer to the medial femoral condyle and trochlea. She continues to make slow, steady progress as expected. Pain is under control, but it is increased this week with increased walking activity and progression off the crutches. The knee ROM has progressed since last progress report. It is mildly stiff to flexion primarily. There is marked crepitus noted to the patellofemoral joint. Quad strength is slowly progressing. It is still weak. She is walking without the crutches primarily now, but walks with a limp. She is progressing toward her goals. We will plan to continue with her existing treatment plan. PROBLEM LIST (Impacting functional limitations):  1. Post-op pain and swelling L knee  2. Decreased ROM L knee   3. Decreased functional strength L knee/LE   4. Decreased gait skills INTERVENTIONS PLANNED: (Treatment may consist of any combination of the following)  1.  Thermal and electric modalities, manual therapies for pain. 2. Manual therapies and therapeutic exercises for ROM and strength. 3. Gait Training, Therapeutic exercises for gait and balance   TREATMENT PLAN:  Effective Dates: 7/3/2019 TO 10/1/2019 (90 days). Frequency/Duration: 3 times a week for 1 week to next MD follow up, then plan for 2-3 days per week for a total of 90 Day(s)  GOALS: (Goals have been discussed and agreed upon with patient.)  Short-Term Functional Goals: Time Frame: 6 weeks  1. Report no more than 5/10 intermittent pain L knee with basic walking and ADL's, and score greater than 30/80 on the LEFS. Progressing, ongoing 9/5/19  2. L knee PROM is greater than or equal to 0-125 degrees flexion. Met 9/5/19  3. L quad strength is 5/5 with good terminal extension strength for stability with walking and progressing into strengthening phase. Progressing, ongoing 9/519  4. Walking with crutches and appropriate weight bearing status with good mechanics/technique on level surfaces and up/down stairs. Met and progressed 9/5/19  Discharge Goals: Time Frame: 12 weeks  1. No more than 3/10 intermittent pain L knee with all normalized daily home and work activities, and score greater than 50/80 on the LEFS. 2. Demonstrate L knee ROM equal to the R for range to return to normalized daily home, work, and exercise activities. 3. Demonstrate good functional knee strength with stair walking. 4. Able to balance with good control in single-leg stand greater than 30 seconds with eyes open, greater than 10 seconds with eyes closed for improved dynamic stability. 5. Independent with [facility] for advanced knee strengthening. Updated Objective Findings:   Observation:No more than mild swelling noted to L knee. Atrophy noted L quads.     ROM:   LLE PROM  L Knee Flexion: 125  L Knee Extension: 2 hyperextension              Functional Mobility: Walking on level ground: walking without crutches, limp and decreased stance time on L; L knee increased flexion in stance phase. Stair walking: step-to pattern up and down. MEDICAL NECESSITY:   · Patient is expected to demonstrate progress in strength, range of motion and balance to return to normalized basic mobility and her normal activity level. · Current impairments are limiting her basic mobility skills. REASON FOR SERVICES/OTHER COMMENTS:  · Patient continues to require skilled intervention due to the complexity of the surgical procedure and need for safe, progressive rehab to promote rethrn to normalized mobility and activity level.     Makayla Samaniego, PT, MSPT, OCS

## 2019-09-05 NOTE — PROGRESS NOTES
Dominique Wetzel  : 1985  Payor: Colt Infante / Plan: [id number] / Product Type: PPO /  [address]  at [facility] JUAN MIGUEL SHERWOOD  [address] [facility], [address], [place] [address].  [phone]   [phone]       OUTPATIENT PHYSICAL THERAPY: Daily Treatment Note 2019  Visit Count: 18     ICD-10: Treatment Diagnosis: Pain in left knee (M25.562); Tear of articular cartilage of left knee, current, sequela (S83.32XS); Chondromalacia, left knee (N62.953)  Precautions/Allergies: post-op precautions as ordered. From EMR: Amoxicillin and Penicillin   MD Orders: Eval and Treat; HEP, ROM, \"full motion, full strength, WBAT\" (19)  MEDICAL/REFERRING DIAGNOSIS:s/p L knee scope with open osteochondral allograft transfer to the medial femoral condyle and trochlea  L knee    DATE OF ONSET: 19  REFERRING PHYSICIAN: Juhi Maynard MD  RETURN PHYSICIAN APPOINTMENT: 10/8/19        Pre-treatment Symptoms/Complaints: Says her knee is more sore today. On her feet a lot again at school today. Pain: Initial:   5-6/10 Post Session: No increase. Medications Last Reviewed: 9/3/19    Updated Objective Findings:   ROM: L Knee PROM: 3-0-125     TREATMENT:     Exercise bike for motion warm up, full x10', rpm=60 on own. Manual Therapies: (15 Minutes): Mobilizations for stiffness to L patellar inferior glide, grade 3 to 4-, 3x30\"; physiologic mobilizations to extension in supine, grade 4- - to 4-, 3x30\"; physiologic flexion in supine and prone, grade 2 to 4- - to 4-, 3x30\"; tibial distraction in prone grade 3, 3x15\". Kinesio tape applied for medial and lateral patellar mechanical correction and tibiofemoral mechanical correction U-strip. Instruction in tape wear and removal if skin is irritated. She verbalizes understanding.   Therapeutic Exercise: (20 Minutes): Exercises for knee and whole LE flexibility with assisted Active Isolated Stretching to soleus, gastroc., posterior hip, lateral hip, hamstrings in 90/90 and SLR, and hip flexors and quads in prone and side-lying, 3\"x10 each. HEP:  She is to ice when home. She can let her knee rest a couple days and use the crutches to unload it as needed for comfort. She can continue with her current HEP if not too irritable. She verbalizes understanding. Date:  9-3-19 Date:  9/5/19 Date:     Activity/Exercise Parameters Parameters Parameters   bike L. 1 x10', rpm=60 L. 1 x10'    ROM/Flexibiltiy Assisted as above assisted as above    Balance/Control:   Dynamic:  10-in Armen Step Over  3x5 eac  -    Strength:   Knee extension 5# x15   6# x15  7# x15 -    Knee flexion Prone  5# x15  6# x15  7# x15 -    Hip Abd Side-lying 2x15 ea -    Bridge Double leg  1x15;  Double to single leg 3x5 ea -      Treatment/Session Summary:    · Response to Treatment: Increased pain/soreness to the knee, so less work load to it in treatment today. Flexion stiffness is the same. Marked patellofemoral crepitus noted. · Communication/Consultation:  None today  · Equipment provided today:  None today  · Recommendations/Intent for next treatment session: Continue with knee motion; progressive quad strengthening, progressing weight bearing and gait mechanics; and thermal modalities as needed for pain and swelling as needed. Treatment Plan of Care Effective Dates: 7/3/2019 TO 10/1/2019 (90 days). Frequency/Duration: 2-3 times a week for 3 week to next MD follow up. Total Treatment Billable Duration: 35 Minutes.   PT Patient Time In/Time Out  Time In: 1600  Time Out: [address], PT    Future Appointments   Date Time Provider Joshua Greenwood   9/9/2019  4:00 PM Lili Putnam, PT [facility]   9/11/2019  4:00 PM Lili Putnam PT [facility]

## 2019-09-09 ENCOUNTER — HOSPITAL ENCOUNTER (OUTPATIENT)
Dept: PHYSICAL THERAPY | Age: 34
Discharge: HOME | End: 2019-09-09
Payer: COMMERCIAL

## 2019-09-09 PROCEDURE — 97110 THERAPEUTIC EXERCISES: CPT

## 2019-09-09 NOTE — PROGRESS NOTES
Sebastián Guo  : 1985  Payor: Colt Infante / Plan: [address] / Product Type: PPO /  [address]  at [facility] JUAN MIGUEL SHERWOOD  [address] [facility], [address], [address] [facility]  [phone]   [phone]       OUTPATIENT PHYSICAL THERAPY: Daily Treatment Note 2019  Visit Count: 19     ICD-10: Treatment Diagnosis: Pain in left knee (M25.562); Tear of articular cartilage of left knee, current, sequela (S83.32XS); Chondromalacia, left knee (S13.352)  Precautions/Allergies: post-op precautions as ordered. From EMR: Amoxicillin and Penicillin   MD Orders: Eval and Treat; HEP, ROM, \"full motion, full strength, WBAT\" (19)  MEDICAL/REFERRING DIAGNOSIS:s/p L knee scope with open osteochondral allograft transfer to the medial femoral condyle and trochlea  L knee    DATE OF ONSET: 19  REFERRING PHYSICIAN: Tatyana Hay MD  RETURN PHYSICIAN APPOINTMENT: 10/8/19        Pre-treatment Symptoms/Complaints: Says her knee is \"ok\". Soreness present, but did not have to do as much walking on school duties today. Says she has been working on straightening her knee in standing and feels she can hold it to 0-deg. Also has been being more conscious of using the L leg more instead of the R all the time. Pain: Initial:   5-6/10 Post Session: No increase. Medications Last Reviewed: 19    Updated Objective Findings:   Not measured. TREATMENT:     Exercise bike for motion warm up, full x10', rpm=60 on own. Therapeutic Exercise: (45 Minutes): Exercises for knee flexibility with active hamstring stretch in supine 90/90, quad and hip flexor stretch in prone knee flexion and prone hip ext with knee flexion, 3\"x10 each. Exercises for knee and whole LE strength and control as per grid below. Exercises modified and progressed as indicated. Verbal cues for techniques and alignment. HEP:  She is to ice when home. She verbalizes understanding.    Date:  9-3-19 Date:  19 Date:  19 Activity/Exercise Parameters Parameters Parameters   bike L. 1 x10', rpm=60 L. 1 x10' L.1 x10'   ROM/Flexibiltiy Assisted as above assisted as above Active as above   Balance/Control:   Dynamic:  10-in Armen Step Over  3x5 eac  - -   Strength:   Knee extension 5# x15   6# x15  7# x15 - Seated, cuff  8# x15  10# 2x10   Knee flexion Prone  5# x15  6# x15  7# x15 - Standing  5# 1x15;  Machine  15# 3x15   Hip Abd Side-lying 2x15 ea - Side lying 1x15 ea   Bridge Double leg  1x15;  Double to single leg 3x5 ea - Double leg 1x10;  Double to single 2x5 ea;  Prone plank  1x5   Shuttle Press   Double leg  25# 1x15  Double to single leg crossed ext   25# 1x10;  Single leg  25# 2x10     Treatment/Session Summary:    · Response to Treatment: Good performance of the exercises. Marked patellofemoral crepitus noted. · Communication/Consultation:  None today  · Equipment provided today:  None today  · Recommendations/Intent for next treatment session: Continue with knee motion; progressive quad strengthening, progressing weight bearing and gait mechanics; and thermal modalities as needed for pain and swelling as needed. Treatment Plan of Care Effective Dates: 7/3/2019 TO 10/1/2019 (90 days). Frequency/Duration: 2-3 times a week for 3 week to next MD follow up. Total Treatment Billable Duration: 45 Minutes.   PT Patient Time In/Time Out  Time In: 1600  Time Out: DIANE Wagner    Future Appointments   Date Time Provider Joshua Greenwood   9/11/2019  4:00 PM Delmy Mckenzie PT [facility]   9/17/2019  4:00 PM DIANE [facility]   9/19/2019  4:00 PM DIANE [facility]   9/23/2019  4:00 PM DIANE [facility]   9/25/2019  4:00 PM [facility]ANE [facility]   9/30/2019  3:45 PM DIANE [facility]   10/8/2019  4:00 PM [facility]ANE [facility]

## 2019-09-11 ENCOUNTER — HOSPITAL ENCOUNTER (OUTPATIENT)
Dept: PHYSICAL THERAPY | Age: 34
Discharge: HOME | End: 2019-09-11
Payer: COMMERCIAL

## 2019-09-11 PROCEDURE — 97110 THERAPEUTIC EXERCISES: CPT

## 2019-09-11 NOTE — PROGRESS NOTES
Kei Moura  : 1985  Payor: [id number] / Plan: [address] / Product Type: PPO /  [address]  at [facility] JUAN MIGUEL SHERWOOD  [address] [facility], [address], [place] [address].  [phone]   [phone]       OUTPATIENT PHYSICAL THERAPY: Daily Treatment Note 2019  Visit Count: 20     ICD-10: Treatment Diagnosis: Pain in left knee (M25.562); Tear of articular cartilage of left knee, current, sequela (S83.32XS); Chondromalacia, left knee (D19.297)  Precautions/Allergies: post-op precautions as ordered. From EMR: Amoxicillin and Penicillin   MD Orders: Eval and Treat; HEP, ROM, \"full motion, full strength, WBAT\" (19)   MEDICAL/REFERRING DIAGNOSIS:s/p L knee scope with open osteochondral allograft transfer to the medial femoral condyle and trochlea  L knee    DATE OF ONSET: 19  REFERRING PHYSICIAN: Eligio Christopher MD  RETURN PHYSICIAN APPOINTMENT: 10/8/19        Pre-treatment Symptoms/Complaints: Says her knee is doing \"pretty good\" today. Says it doesn't hurt. Had lunch duty today and did some walking, but not as much discomfort to it. Pain: Initial:   203/10 Post Session: 5/10 anterior knee   Medications Last Reviewed: 19    Updated Objective Findings:   Not measured. TREATMENT:     Exercise bike for motion warm up, full x10', rpm=60 on own. Therapeutic Exercise: (45 Minutes): Exercises for knee motion with passive physiologic knee flexion hold/relax, 3x30\"; assisted Active Isolated Stretching to soleus, gastroc., posterior hip, active stretch to hamstrings in 90/90 and SLR, and to hip flexors and quads in prone, 3\"x10 each. Exercises for knee and whole LE strength and control as per grid below. Exercises modified and progressed as indicated. Verbal cues for techniques and alignment. HEP:  She is to ice when home. She verbalizes understanding.    Date:  9-3-19 Date:  19 Date:  19 Date  911!9   Activity/Exercise Parameters Parameters Parameters bike L. 1 x10', rpm=60 L. 1 x10' L.1 x10' L. 1 x10'   ROM/Flexibiltiy Assisted as above assisted as above Active as above As above   Balance/Control:   Dynamic:  10-in Armen Step Over  3x5 eac  - - -   Strength:   Knee extension 5# x15   6# x15  7# x15 - Seated, cuff  8# x15  10# 2x10 10# attempted , but unable,   5# 2x10   Knee flexion Prone  5# x15  6# x15  7# x15 - Standing  5# 1x15;  Machine  15# 3x15 Machine   15# 2x15   Hip Abd Side-lying 2x15 ea - Side lying 1x15 ea Standing wall iso slide  2x5 ea   Bridge Double leg  1x15;  Double to single leg 3x5 ea - Double leg 1x10;  Double to single 2x5 ea;  Prone plank  1x5 Double leg 1x10;  Double to single 2x5 [employer]   Double leg  25# 1x15  Double to single leg crossed ext   25# 1x10;  Single leg  25# 2x10 Double to single leg crossed ext   37# 1x10;  Single leg  37# 3x10   Crossed Extension     At wall   2x10 ea     Treatment/Session Summary:    · Response to Treatment: Good performance of the exercises. Increased weakness noted to L quads with open chain knee extension. Good performance with the other exercises. Pain noted with the strength work. · Communication/Consultation:  None today  · Equipment provided today:  None today  · Recommendations/Intent for next treatment session: Continue with knee motion; progressive quad and whole LE strengthening, progressing balance and control. Treatment Plan of Care Effective Dates: 7/3/2019 TO 10/1/2019 (90 days). Frequency/Duration: 2-3 times a week for 3 week to next MD follow up. Total Treatment Billable Duration: 45 Minutes.   PT Patient Time In/Time Out  Time In: 1600  Time Out: DIANE Wagner    Future Appointments   Date Time Provider Joshua Greenwood   9/17/2019  4:00 PM Delmy Mckenzie PT [facility]   9/19/2019  4:00 PM DIANE [facility]   9/23/2019  4:00 PM DIANE [facility]oyd [facility]   9/25/2019  4:00 PM Yaritza Cuenca Dalila Balderas, PT [facility]   9/30/2019  3:45 PM Brannon April, PT [facility]   10/8/2019  4:00 PM Brannon April, PT [facility]

## 2019-09-17 ENCOUNTER — HOSPITAL ENCOUNTER (OUTPATIENT)
Dept: PHYSICAL THERAPY | Age: 34
Discharge: HOME | End: 2019-09-17
Payer: COMMERCIAL

## 2019-09-17 PROCEDURE — 97110 THERAPEUTIC EXERCISES: CPT

## 2019-09-17 NOTE — PROGRESS NOTES
Zoila Klein  : 1985  Payor: Colt Infante / Plan: [address] / Product Type: PPO /  [address]  at [address] [place] [address] Rd  [address] [facility], [address], [place] [address].  [phone]   [phone]       OUTPATIENT PHYSICAL THERAPY: Daily Treatment Note 2019  Visit Count: 21     ICD-10: Treatment Diagnosis: Pain in left knee (M25.562); Tear of articular cartilage of left knee, current, sequela (S83.32XS); Chondromalacia, left knee (P12.060)  Precautions/Allergies: post-op precautions as ordered. From EMR: Amoxicillin and Penicillin   MD Orders: Eval and Treat; HEP, ROM, \"full motion, full strength, WBAT\" (19)   MEDICAL/REFERRING DIAGNOSIS:s/p L knee scope with open osteochondral allograft transfer to the medial femoral condyle and trochlea  L knee    DATE OF ONSET: 19  REFERRING PHYSICIAN: Hilda Muller MD  RETURN PHYSICIAN APPOINTMENT: 10/8/19        Pre-treatment Symptoms/Complaints: Says her knee is doing \"better\". Swelling is less and was able to see some definition to the thigh and knee over the weekend. today. Pain: Initial:    Post Session:    Medications Last Reviewed: 19    Updated Objective Findings:   Observation: Walks in with mild limp on L stance. Palpation: Palpable crepitus noted to L patellofemoral joint with active flex/ext. ROM: L Knee: 3-0-132     TREATMENT:     Exercise bike for motion warm up, full x10', rpm=60 on own. Therapeutic Exercise: (45 Minutes): Exercises for knee motion with passive physiologic knee flexion hold/relax, 3x30\"; assisted Active Isolated Stretching to soleus, gastroc., posterior hip, active stretch to hamstrings in 90/90 and SLR, lateral hip, and to hip flexors and quads in side-lying and prone, 3\"x10 each. Exercises for knee and whole LE strength and control as per grid below. Exercises modified and progressed as indicated. Verbal cues for techniques and alignment. HEP:  She is to ice when home.  She verbalizes understanding. Date:  9-3-19 Date:  9/5/19 Date:  9/9/19 Date  911!9 Date  9-17-19   Activity/Exercise Parameters Parameters Parameters     bike L. 1 x10', rpm=60 L. 1 x10' L.1 x10' L. 1 x10' L. 1 x10'   ROM/Flexibiltiy Assisted as above assisted as above Active as above As above As above   Balance/Control:   Dynamic:  10-in Armen Step Over  3x5 eac  - - - Dynamic:  12-in Armen Step Over  2x5 ea   Strength:   Knee extension 5# x15   6# x15  7# x15 - Seated, cuff  8# x15  10# 2x10 10# attempted , but unable,   5# 2x10 Short arc  10# cuff eccentrics  1x10, 1x6   5# conc/ecc 2x10   Knee flexion Prone  5# x15  6# x15  7# x15 - Standing  5# 1x15;  Machine  15# 3x15 Machine   15# 2x15 Standing  5# 2x15;  Machine, unilat  15# x15 ea  20# x10 ea   Hip Abd Side-lying 2x15 ea - Side lying 1x15 ea Standing wall iso slide  2x5 ea -   Bridge Double leg  1x15;  Double to single leg 3x5 ea - Double leg 1x10;  Double to single 2x5 ea;  Prone plank  1x5 Double leg 1x10;  Double to single 2x5 ea [facility] [employer]   Double leg  25# 1x15  Double to single leg crossed ext   25# 1x10;  Single leg  25# 2x10 Double to single leg crossed ext   37# 1x10;  Single leg  37# 3x10 Double to single leg crossed ext  37# x10 ea  50# 2x10 ea   Crossed Extension     At wall   2x10 ea -     Treatment/Session Summary:    · Response to Treatment: Good effort with the exercises. Crepitus and pain to PF joint around 30 deg flexion, especially when loaded. Quads are weak, especially in terminal extension ranges. Mild stiffness noted to the knee, but good ranges. She has a lot of hyperextension on the R, but <5 deg on the L. Walking and overall pain and swelling are getting better. She is about 3 months post op.   · Communication/Consultation:  None today  · Equipment provided today:  None today  · Recommendations/Intent for next treatment session: Continue with knee motion; progressive quad and whole LE strengthening, progressing balance and control. Treatment Plan of Care Effective Dates: 7/3/2019 TO 10/1/2019 (90 days). Frequency/Duration: 2-3 times a week for 3 week to next MD follow up. Total Treatment Billable Duration: 45 Minutes.   PT Patient Time In/Time Out  Time In: 1600  Time Out: DIANE Wagner    Future Appointments   Date Time Provider Joshua Greenwood   9/19/2019  4:00 PM Betty Christensen, PT [facility]   9/23/2019  4:00 PM Betty Christensen, PT [facility]   9/25/2019  4:00 PM Betty Christensen, PT [facility]   9/30/2019  3:45 PM Betty Christensen, PT [facility]   10/8/2019  4:00 PM Betty Christensen, DIANE MCCOY [facility]

## 2019-09-19 ENCOUNTER — HOSPITAL ENCOUNTER (OUTPATIENT)
Dept: PHYSICAL THERAPY | Age: 34
Discharge: HOME | End: 2019-09-19
Payer: COMMERCIAL

## 2019-09-19 NOTE — PROGRESS NOTES
Bora Daisha  : 1985  Payor: [address] / Plan: [address] / Product Type: PPO /  [address] at [facility] JUAN MIGUEL SHERWOOD  [address] [facility], [address], [address] [facility]  [phone]   [phone]       OUTPATIENT PHYSICAL THERAPY: Cancellation Note 2019     ICD-10: Treatment Diagnosis: Pain in left knee (M25.562); Tear of articular cartilage of left knee, current, sequela (S83.32XS); Chondromalacia, left knee (J51.361)  Precautions/Allergies: post-op precautions as ordered. From EMR: Amoxicillin and Penicillin   MD Orders: Eval and Treat; HEP, ROM, \"full motion, full strength, WBAT\" (19)   MEDICAL/REFERRING DIAGNOSIS:s/p L knee scope with open osteochondral allograft transfer to the medial femoral condyle and trochlea  L knee    DATE OF ONSET: 19  REFERRING PHYSICIAN: Kennedy Ash MD  RETURN PHYSICIAN APPOINTMENT: 10/8/19        Bora Braybruna called to cancel her appointment today secondary to a work meeting. We will plan to continue with her existing treatment plan.       Brendan Sutherland PT    Future Appointments   Date Time Provider Joshua Greenwood   2019  4:00 PM Shen Kelly PT [facility]   2019  4:00 PM Shen Kelly PT [facility]   2019  4:00 PM DIANE [facility]   2019  3:45 PM DIANE [facility]   10/8/2019  4:00 PM DIANE [facility]

## 2019-09-23 ENCOUNTER — HOSPITAL ENCOUNTER (OUTPATIENT)
Dept: PHYSICAL THERAPY | Age: 34
Discharge: HOME | End: 2019-09-23
Payer: COMMERCIAL

## 2019-09-23 PROCEDURE — 97035 APP MDLTY 1+ULTRASOUND EA 15: CPT

## 2019-09-23 PROCEDURE — 97110 THERAPEUTIC EXERCISES: CPT

## 2019-09-23 NOTE — PROGRESS NOTES
Cristóbal Maria  : 1985  Payor: Colt Infante / Plan: [address] / Product Type: PPO /  [address]  at [address] [place] [address] Rd  [address] [facility], [address], [address] [place] [address]  [phone]   [phone]       OUTPATIENT PHYSICAL THERAPY: Daily Treatment Note 2019  Visit Count: 22     ICD-10: Treatment Diagnosis: Pain in left knee (M25.562); Tear of articular cartilage of left knee, current, sequela (S83.32XS); Chondromalacia, left knee (H21.898)  Precautions/Allergies: post-op precautions as ordered. From EMR: Amoxicillin and Penicillin   MD Orders: Eval and Treat; HEP, ROM, \"full motion, full strength, WBAT\" (19)   MEDICAL/REFERRING DIAGNOSIS:s/p L knee scope with open osteochondral allograft transfer to the medial femoral condyle and trochlea  L knee    DATE OF ONSET: 19  REFERRING PHYSICIAN: Rafael Bell MD  RETURN PHYSICIAN APPOINTMENT: 10/8/19        Pre-treatment Symptoms/Complaints: Says she missed last time secondary to a mandatory work meeting. Says she started having significant pain to the lateral lower leg over the weekend. Not really to the knee. Felt it today after walking to her car and back. Went to the school nurse who thought the veins in the area looked distended. Pain: Initial:    Post Session:    Medications Last Reviewed: 19    Updated Objective Findings:   Observation: Walks in with no more than mild limp on L stance. No apparent discoloration or swelling noted to the L lower leg. Palpation: tender to L mid to distal peroneals. ROM: L Knee: 3-0-132  Strength: Mild pain with with resisted foot eversion on L.      TREATMENT:     Therapeutic Modalities: (15 Minutes): Ultrasound to L mid peroneals for thermal effects to soft tissue here--1 MHz, 1.5 W/cm2 x10'. No adverse reaction noted. Trial with Kinesio tape for lymphatic correction over L lateral lower leg. Instruction in tape wear and removal if skin is irritated.  She verbalizes understanding. Therapeutic Exercise: (25 Minutes): Exercises for knee motion with passive physiologic knee flexion hold/relax, 3x30\"; assisted Active Isolated Stretching to soleus, gastroc., posterior hip, active stretch to hamstrings in 90/90 and SLR, lateral hip, and to hip flexors and quads in side-lying, 3\"x10 each. Exercise for static balance and dynamic gait drills for knee control. HEP:  She is to remove the tape in 2-3 days. She verbalizes understanding. Date:  9-3-19 Date:  9/5/19 Date:  9/9/19 Date  911!9 Date  9-17-19 Date  9-23-19   Activity/Exercise Parameters Parameters Parameters      bike L. 1 x10', rpm=60 L. 1 x10' L.1 x10' L. 1 x10' L. 1 x10' -   ROM/Flexibiltiy Assisted as above assisted as above Active as above As above As above As above   Balance/Control:   Dynamic:  10-in Armen Step Over  3x5 eac  - - - Dynamic:  12-in Armen Step Over  2x5 ea Static single leg   3x ea   Strength:   Knee extension 5# x15   6# x15  7# x15 - Seated, cuff  8# x15  10# 2x10 10# attempted , but unable,   5# 2x10 Short arc  10# cuff eccentrics  1x10, 1x6   5# conc/ecc 2x10 -   Knee flexion Prone  5# x15  6# x15  7# x15 - Standing  5# 1x15;  Machine  15# 3x15 Machine   15# 2x15 Standing  5# 2x15;  Machine, unilat  15# x15 ea  20# x10 ea -   Hip Abd Side-lying 2x15 ea - Side lying 1x15 ea Standing wall iso slide  2x5 ea - -   Bridge Double leg  1x15;  Double to single leg 3x5 ea - Double leg 1x10;  Double to single 2x5 ea;  Prone plank  1x5 Double leg 1x10;  Double to single 2x5 ea - -   Apartment List   Double leg  25# 1x15  Double to single leg crossed ext   25# 1x10;  Single leg  25# 2x10 Double to single leg crossed ext   37# 1x10;  Single leg  37# 3x10 Double to single leg crossed ext  37# x10 ea  50# 2x10 ea -   Crossed Extension     At wall   2x10 ea - -     Treatment/Session Summary:    · Response to Treatment: New complaint of L lateral lower leg pain starting 2 days ago.  She describes it as a venous swelling seen on the skin in this area after walking a while. I did not really see this. Modaliites for the area done as trial. Her knee is doing better. Walking stability and control is improving with less of a limp noted. She was advised to consult her MD if persists this week. · Communication/Consultation:  None today  · Equipment provided today:  None today  · Recommendations/Intent for next treatment session: She is out of town and has work conflicts this week, so will be back in clinic next week. She is to remove the Kinesio tape in 2-3 days. She to consult her MD if the lateral lower leg problem persists the next few days. We will continue with knee motion; progressive quad and whole LE strengthening, progressing balance and control on her return. Treatment Plan of Care Effective Dates: 7/3/2019 TO 10/1/2019 (90 days). Frequency/Duration: 2-3 times a week for 3 week to next MD follow up. Total Treatment Billable Duration: 40 Minutes.   PT Patient Time In/Time Out  Time In: 6442  Time Out: [address] [facility],     Future Appointments   Date Time Provider Joshua Greenwood   9/25/2019  4:00 PM Ingrid Brennan PT [facility]   9/30/2019  3:45 PM DIANE Garrett [facility]   10/8/2019  4:00 PM Ingrid Brennan PT [facility]

## 2019-09-30 ENCOUNTER — HOSPITAL ENCOUNTER (OUTPATIENT)
Dept: PHYSICAL THERAPY | Age: 34
Discharge: HOME | End: 2019-09-30
Payer: COMMERCIAL

## 2019-09-30 PROCEDURE — 97110 THERAPEUTIC EXERCISES: CPT

## 2019-09-30 NOTE — PROGRESS NOTES
Pankaj Mark  : 1985  Payor: 550Ayaka Infante / Plan: [address] / Product Type: PPO /  [address] at [facility] JUAN MIGUEL SHERWOOD  [address] [facility], [address], [place] [address].  [phone]   [phone]       OUTPATIENT PHYSICAL THERAPY: Daily Treatment Note 2019  Visit Count: 23     ICD-10: Treatment Diagnosis: Pain in left knee (M25.562); Tear of articular cartilage of left knee, current, sequela (S83.32XS); Chondromalacia, left knee (B03.420)  Precautions/Allergies: post-op precautions as ordered. From EMR: Amoxicillin and Penicillin   MD Orders: Eval and Treat; HEP, ROM, \"full motion, full strength, WBAT\" (19)   MEDICAL/REFERRING DIAGNOSIS:s/p L knee scope with open osteochondral allograft transfer to the medial femoral condyle and trochlea  L knee    DATE OF ONSET: 19  REFERRING PHYSICIAN: Aldona Harada., MD  RETURN PHYSICIAN APPOINTMENT: 10/8/19        Pre-treatment Symptoms/Complaints: Says the lower leg vein issue is better. It hasn't been tender and as swollen as last week. Did notice it on the R leg in the same area as well. L knee has been popping more with walking the past 3 days. Pain: Initial:    Post Session:    Medications Last Reviewed: 19    Updated Objective Findings:   Observation: Walks in with no more than mild limp on L stance, increased L knee flexion in stance vs R. No apparent discoloration or swelling noted to the L lower leg. ROM: L Knee: 3-0-132     TREATMENT:     Bike x6' from active warm up. Therapeutic Exercise: (34 Minutes): Exercises for knee motion with passive physiologic knee flexion hold/relax, 3x30\"; assisted Active Isolated Stretching to soleus, gastroc., posterior hip, active stretch to hamstrings in 90/90 and SLR, lateral hip, and to hip flexors and quads in side-lying, 3\"x10 each. Exercise for static balance, control, and strength as per grid.  Cues for movement execution with the progression to wobble board and forward step ups. HEP:  She is to work on step up mechanics on stair ascent at home. She verbalizes understanding. Date:  9-3-19 Date:  9/5/19 Date:  9/9/19 Date  911!9 Date  9-17-19 Date  9-23-19 Date  9/30/19   Activity/Exercise Parameters Parameters Parameters       bike L. 1 x10', rpm=60 L. 1 x10' L.1 x10' L. 1 x10' L. 1 x10' - L. 1 x6'   ROM/Flexibiltiy Assisted as above assisted as above Active as above As above As above As above As above   Balance/Control:   Dynamic:  10-in Rey Step Over  3x5 eac  - - - Dynamic:  12-in Rey Step Over  2x5 ea Static single leg   3x ea Wobble board x20-30 ea 4-ways   Strength:   Knee extension 5# x15   6# x15  7# x15 - Seated, cuff  8# x15  10# 2x10 10# attempted , but unable,   5# 2x10 Short arc  10# cuff eccentrics  1x10, 1x6   5# conc/ecc 2x10 - Standing TKE, blue 10\"x10,   With 10\" rey step over 3x5   Knee flexion Prone  5# x15  6# x15  7# x15 - Standing  5# 1x15;  Machine  15# 3x15 Machine   15# 2x15 Standing  5# 2x15;  Machine, unilat  15# x15 ea  20# x10 ea - -   Hip Abd Side-lying 2x15 ea - Side lying 1x15 ea Standing wall iso slide  2x5 ea - - -   Bridge Double leg  1x15;  Double to single leg 3x5 ea - Double leg 1x10;  Double to single 2x5 ea;  Prone plank  1x5 Double leg 1x10;  Double to single 2x5 ea - - -   Shuttle Press   Double leg  25# 1x15  Double to single leg crossed ext   25# 1x10;  Single leg  25# 2x10 Double to single leg crossed ext   37# 1x10;  Single leg  37# 3x10 Double to single leg crossed ext  37# x10 ea  50# 2x10 ea - -   Crossed Extension     At wall   2x10 ea - - -   Sqaut       -   Step up       Forward  4-in 2x10 ea  6-in 2x5 ea     Treatment/Session Summary:    · Response to Treatment: Crepitus noted to patellofemoral joint. Good effort with the exercises today. Functional weakness to the L quads, LE. Continues with terminal quad weakness.    · Communication/Consultation:  None today  · Equipment provided today:  None today  · Recommendations/Intent for next treatment session: We will continue with knee motion; progressive quad and whole LE strengthening, progressing balance and control on her return. Treatment Plan of Care Effective Dates: 7/3/2019 TO 10/1/2019 (90 days). Frequency/Duration: 2-3 times a week for 3 week to next MD follow up. Total Treatment Billable Duration: 34 Minutes.   PT Patient Time In/Time Out  Time In: 1632  Time Out: UlPatrick Tompkins 139, PT    Future Appointments   Date Time Provider Joshua Greenwood   10/8/2019  4:00 PM Jo Iqbal, PT [facility]

## 2019-10-02 ENCOUNTER — HOSPITAL ENCOUNTER (OUTPATIENT)
Dept: PHYSICAL THERAPY | Age: 34
Discharge: HOME | End: 2019-10-02
Payer: COMMERCIAL

## 2019-10-02 PROCEDURE — 97110 THERAPEUTIC EXERCISES: CPT

## 2019-10-02 NOTE — THERAPY RECERTIFICATION
Kita Lindsay  : 1985  Primary: Kriss Guadarrama [facility]  Secondary:  [address]  at [facility] JUAN MIGUEL SHERWOOD  [address] [place] [address],  [place].  [phone]   [phone]       OUTPATIENT PHYSICAL THERAPY:Recertification      ICD-10: Treatment Diagnosis: Pain in left knee (M25.562); Tear of articular cartilage of left knee, current, sequela (S83.32XS); Chondromalacia, left knee (X78.357)  Precautions/Allergies: post-op precautions as ordered. From EMR: Amoxicillin and Penicillin   MD Orders: Eval and Treat; HEP, ROM, \"full motion, full strength, WBAT\" (19) MEDICAL/REFERRING DIAGNOSIS:s/p L knee scope with open osteochondral allograft transfer to the medial femoral condyle and trochlea  L knee    DATE OF ONSET: 19  REFERRING PHYSICIAN: Elver Tapia MD  RETURN PHYSICIAN APPOINTMENT:      RE-CERTIFICATION ASSESSMENT (10/2/19):  Ms. Elijah Velazquez has attended 23 treatments to date. She is now 3 1/2 months s/p L knee scope with removal of loose bodies, open osteochondral allograft transfer to the medial femoral condyle and trochlea. She continues to make slow, steady progress. She continues to have pain and soreness to the knee, but it is under control. There is marked crepitus to the L knee. L knee ROM mildly stiff into hyperextension. Quad weakness is her primary impairment. This is getting better, but pain on resisted quad activities limits strength progression. Functionally, she is walking better, but still with mild limp on level ground. This is worse with stair walking. She is making progress toward her goals. She will benefit from continued PT to further progress strength, control, and function, then discharge to a knee [facility]. PROBLEM LIST (Impacting functional limitations):  1. Post-op pain and swelling L knee  2. Decreased ROM L knee   3. Decreased functional strength L knee/LE   4.  Decreased gait skills INTERVENTIONS PLANNED: (Treatment may consist of any combination of the following)  1. Thermal and electric modalities, manual therapies for pain. 2. Manual therapies and therapeutic exercises for ROM and strength. 3. Gait Training, Therapeutic exercises for gait and balance   TREATMENT PLAN:  Effective Dates: 10/2/2019 TO 12/2/2019 (60 days). Frequency/Duration: 2 times a week for an additional 60 days. GOALS: (Goals have been discussed and agreed upon with patient.)  Short-Term Functional Goals: Time Frame: 6 weeks  1. Report no more than 5/10 intermittent pain L knee with basic walking and ADL's, and score greater than 30/80 on the LEFS. Progressing, ongoing 9/5/19  2. L knee PROM is greater than or equal to 0-125 degrees flexion. Met 9/5/19  3. L quad strength is 5/5 with good terminal extension strength for stability with walking and progressing into strengthening phase. Progressing, ongoing 10/2/19  4. Walking with crutches and appropriate weight bearing status with good mechanics/technique on level surfaces and up/down stairs. Met 10/2/19  Discharge Goals: Time Frame: 12 weeks  1. No more than 3/10 intermittent pain L knee with all normalized daily home and work activities, and score greater than 50/80 on the LEFS. Ongoing 10/2/19   2. Demonstrate L knee ROM equal to the R for range to return to normalized daily home, work, and exercise activities. Progressing, ongoing 10/2/19  3. Demonstrate good functional knee strength with stair walking. Progressing, ongoing 10/2/19  4. Able to balance with good control in single-leg stand greater than 30 seconds with eyes open, greater than 10 seconds with eyes closed for improved dynamic stability. Progressing, ongoing 10/2/19  5. Independent with [facility] for advanced knee strengthening. Progressing, ongoing 10/2/19    Updated Objective Findings:   Observation: Walks in with mild limp on L stance, increased L knee flexion in stance vs R. No apparent discoloration or swelling noted to the L lower leg.  Palpable and audible crepitus to L knee with flexion/ext. ROM: L Knee: 3-0-132  Strength: L Knee Ext: 4+ positional strength, 3- terminal extension. Functional Mobility: Sit to/from Stand: increased use of R LE, UE's. Walking on level ground: mild limp on L stance, increased knee flexion on L stance, decreased push off. Stair walking: up stairs with reciprocal pattern, one rail, limp on L; down stais with step to pattern, tries reciprocal but poor L knee eccentric control. MEDICAL NECESSITY:   · Patient is expected to demonstrate progress in strength, range of motion and balance to return to normalized basic mobility and her normal activity level. · Current impairments are limiting her basic mobility skills. REASON FOR SERVICES/OTHER COMMENTS:  · Patient continues to require skilled intervention due to the complexity of the surgical procedure and need for safe, progressive rehab to promote rethrn to normalized mobility and activity level. Rehabilitation Potential For Stated Goals: Good  Regarding Shine Clarke therapy, I certify that the treatment plan above will be carried out by a therapist or under their direction.   Thank you for this referral,    Chetan Flores, PT, MSPT, OCS       Referring Physician Signature: Shanti Osuna., MD _______________________________ Date _____________

## 2019-10-02 NOTE — PROGRESS NOTES
Israel Neal  : 1985  Payor: [id number] / Plan: [id number] / Product Type: PPO /  [address] at [facility] JUAN MIGUEL SHERWOOD  [address] [facility], [address], [address] [facility]  [phone]   [phone]       OUTPATIENT PHYSICAL THERAPY: Daily Treatment Note 10/2/2019  Visit Count: 24     ICD-10: Treatment Diagnosis: Pain in left knee (M25.562); Tear of articular cartilage of left knee, current, sequela (S83.32XS); Chondromalacia, left knee (B01.090)  Precautions/Allergies: post-op precautions as ordered. From EMR: Amoxicillin and Penicillin   MD Orders: Eval and Treat; HEP, ROM, \"full motion, full strength, WBAT\" (19)   MEDICAL/REFERRING DIAGNOSIS:s/p L knee scope with open osteochondral allograft transfer to the medial femoral condyle and trochlea  L knee    DATE OF ONSET: 19  REFERRING PHYSICIAN: Ernestina Yoder MD  RETURN PHYSICIAN APPOINTMENT: 10/8/19        Pre-treatment Symptoms/Complaints: Says her knee is doing \"ok\". Continues to have soreness and some swelling to it, but she is able to continue to do her basic activities. Has been working on stair ascent walking. Pain: Initial:    Post Session:    Medications Last Reviewed: 19    Updated Objective Findings:   Observation: Walks in with mild limp on L stance, increased L knee flexion in stance vs R. No apparent discoloration or swelling noted to the L lower leg. Palpable and audible crepitus to L knee with flexion/ext. ROM: L Knee: 3-0-132  Strength: L Knee Ext: 4+ positional strength, 3- terminal extension. Functional Mobility: Sit to/from Stand: increased use of R LE, UE's. Walking on level ground: mild limp on L stance, increased knee flexion on L stance, decreased push off. Stair walking: up stairs with reciprocal pattern, one rail, limp on L; down stais with step to pattern, tries reciprocal but poor L knee eccentric control. TREATMENT:     Bike for active warm up.   Therapeutic Exercise: (39 Minutes): Exercises for L knee and whole LE chain strength and control as per grid. Cues for alignment, movement pattern for the closed chain exercises especially. .   HEP:  She is to work on step up mechanics on stair ascent at home. She verbalizes understanding.    Date:  9-3-19 Date:  9/5/19 Date:  9/9/19 Date  911!9 Date  9-17-19 Date  9-23-19 Date  9/30/19 Date  10/2/19   Activity/Exercise Parameters Parameters Parameters        bike L. 1 x10', rpm=60 L. 1 x10' L.1 x10' L. 1 x10' L. 1 x10' - L. 1 x6' L. 1 x10'   ROM/Flexibiltiy Assisted as above assisted as above Active as above As above As above As above As above -   Balance/Control:   Dynamic:  10-in Rey Step Over  3x5 eac  - - - Dynamic:  12-in Rey Step Over  2x5 ea Static single leg   3x ea [employer] Magn[employer]ble board x20-30 ea 4-ways -   Strength:   Knee extension 5# x15   6# x15  7# x15 - Seated, cuff  8# x15  10# 2x10 10# attempted , but unable,   5# 2x10 Short arc  10# cuff eccentrics  1x10, 1x6   5# conc/ecc 2x10 - Standing TKE, blue 10\"x10,   With 10\" rey step over 3x5 Machine bilat  10# 3x10,   L iso hold 10# x5;  unilat L  4# 3x10   Knee flexion Prone  5# x15  6# x15  7# x15 - Standing  5# 1x15;  Machine  15# 3x15 Machine   15# 2x15 Standing  5# 2x15;  Machine, unilat  15# x15 ea  20# x10 ea - - Machine  bilat 20# 3x10  unilat 20# 1x10   Hip Abd Side-lying 2x15 ea - Side lying 1x15 ea Standing wall iso slide  2x5 ea - - - Wall iso slide  3x10 ea   Bridge Double leg  1x15;  Double to single leg 3x5 ea - Double leg 1x10;  Double to single 2x5 ea;  Prone plank  1x5 Double leg 1x10;  Double to single 2x5 ea - - - -   Shuttle Press   Double leg  25# 1x15  Double to single leg crossed ext   25# 1x10;  Single leg  25# 2x10 Double to single leg crossed ext   37# 1x10;  Single leg  37# 3x10 Double to single leg crossed ext  37# x10 ea  50# 2x10 ea - - Double leg press  50# x10  75# x10  With crossed ext  75# x10;  Single-leg L  37# 2x10   Crossed Extension At wall   2x10 ea - - - Wall drill 3x10 ea   Squat       - -   Step up       Forward  4-in 2x10 ea  6-in 2x5 ea Forward  6-in 3x10 ea   Straight Leg Dead Lift        10# 3x10     Treatment/Session Summary:    · Response to Treatment: She is 3 months post op. Good knee ROM, but mild stiffness to extension. Weakness to quads primarily and weak to the whole LQ chain. Improving walking and activity level, but still limps on L. Marked crepitus noted to patellofemoral joint, and pain here which limits quad strengthening  · Communication/Consultation:  None today  · Equipment provided today:  None today  · Recommendations/Intent for next treatment session: We will continue with knee motion; progressive quad and whole LE strengthening, progressing balance and control. She has a follow up with Dr. Brian Mendoza next week. Treatment Plan of Care Effective Dates: 10/2/2019 TO 12/2/2019 (60 days). Frequency/Duration: 2 times a week for an additional 60 days       Total Treatment Billable Duration: 45 Minutes.   PT Patient Time In/Time Out  Time In: 1615  Time Out: [address], PT    Future Appointments   Date Time Provider Joshua Greenwood   10/8/2019  4:00 PM Shane Leblanc, PT [facility]

## 2019-10-03 ENCOUNTER — APPOINTMENT (OUTPATIENT)
Dept: PHYSICAL THERAPY | Age: 34
End: 2019-10-03
Payer: COMMERCIAL

## 2019-10-08 ENCOUNTER — HOSPITAL ENCOUNTER (OUTPATIENT)
Dept: PHYSICAL THERAPY | Age: 34
Discharge: HOME | End: 2019-10-08
Payer: COMMERCIAL

## 2019-10-08 NOTE — PROGRESS NOTES
Rashaun Villalta  : 1985  Payor: Colt Infante / Plan: [id number] / Product Type: PPO /  [address]  at [address] [place] [address] Rd  [address] [facility], [address], [place] [address].  [phone]   [phone]       OUTPATIENT PHYSICAL THERAPY: Cancellation Note 10/8/2019  Visit Count: 25     ICD-10: Treatment Diagnosis: Pain in left knee (M25.562); Tear of articular cartilage of left knee, current, sequela (S83.32XS); Chondromalacia, left knee (L58.081)  Precautions/Allergies: post-op precautions as ordered. From EMR: Amoxicillin and Penicillin   MD Orders: Eval and Treat; HEP, ROM, \"full motion, full strength, WBAT\" (19)   MEDICAL/REFERRING DIAGNOSIS:s/p L knee scope with open osteochondral allograft transfer to the medial femoral condyle and trochlea  L knee    DATE OF ONSET: 19  REFERRING PHYSICIAN: Ian Tidwell MD  RETURN PHYSICIAN APPOINTMENT:         Rashaun Villalta appointment is cancelled today secondary to conflict with her follow up appointment with Dr. Lucien Krueger. We will continue with her current treatment plan on her return as ordered. Frederic Larose, PT    No future appointments.

## 2019-10-14 ENCOUNTER — HOSPITAL ENCOUNTER (OUTPATIENT)
Dept: PHYSICAL THERAPY | Age: 34
Discharge: HOME | End: 2019-10-14
Payer: COMMERCIAL

## 2019-10-14 PROCEDURE — 97110 THERAPEUTIC EXERCISES: CPT

## 2019-10-14 NOTE — PROGRESS NOTES
Darryl Core  : 1985  Payor: Colt Infante / Plan: [address] / Product Type: PPO /  [address]  at [facility] JUAN MIGUEL SHERWOOD  [address] [place] [address], [address], [place] [address].  [phone]   [phone]       OUTPATIENT PHYSICAL THERAPY: Daily Treatment Note 10/14/2019  Visit Count: 25     ICD-10: Treatment Diagnosis: Pain in left knee (M25.562); Tear of articular cartilage of left knee, current, sequela (S83.32XS); Chondromalacia, left knee (K41.950)  Precautions/Allergies: post-op precautions as ordered. From EMR: Amoxicillin and Penicillin   MD Orders: Eval and Treat; HEP, ROM, \"full motion, full strength, all modalities\" (10/8/19)   MEDICAL/REFERRING DIAGNOSIS:s/p L knee scope with open osteochondral allograft transfer to the medial femoral condyle and trochlea  L knee    DATE OF ONSET: 19  REFERRING PHYSICIAN: Maksim Forrest MD  RETURN PHYSICIAN APPOINTMENT: 19        Reports having her follow up with Dr. Nicolas Marquez last week. He is pleased with her motion. Just advised to continue to work on quad strength. She was provided a new order to continue PT and she is to return to him in about 6 weeks. Pre-treatment Symptoms/Complaints: Says her knee is \"ok\". Not too much pain--more annoying than pain. The knee has been popping a lot. R foot has been hurting over the weekend after doing a lot of walking on Friday. Was very tender to dorsal R foot, but better today.  to it. Says hips were sore after last time. Pain: Initial:    Post Session:    Medications Last Reviewed: Tylenol prn 10/14/19    Updated Objective Findings:   No new measure. TREATMENT:     Bike for active warm up.   Therapeutic Exercise: (45 Minutes): Exercises for L knee motion and whole LE flexibility with passive physiologic knee extension and flexion in supine, hold/relax 30\"x3 each; assisted Active Isolated Stretching to gastroc., posterior hip, lateral hip, hamstrings in 90/90 and SLR, hip flexors and quads in side-lying and Tramaine positions, 3\"x10 each. L quad strength using neuromuscular electrical stimulation---Christian III program 3, 10\" on 20\" off x5' each with quad set terminal extension, sitting knee extension, and standing terminal extension as indicated below. Exercises for knee and whole LE strength as per grid below. HEP:  She is to ice at home. No new HEP. Date:  9-3-19 Date:  9/5/19 Date:  9/9/19 Date  911!9 Date  9-17-19 Date  9-23-19 Date  9/30/19 Date  10/2/19 Date  10-14-19   Activity/Exercise Parameters Parameters Parameters         bike L. 1 x10', rpm=60 L. 1 x10' L.1 x10' L. 1 x10' L. 1 x10' - L. 1 x6' L. 1 x10' L. 1 x10'   ROM/Flexibiltiy Assisted as above assisted as above Active as above As above As above As above As above - Assisted as above   Balance/Control:   Dynamic:  10-in Armen Step Over  3x5 eac  - - - Dynamic:  12-in Armen Step Over  2x5 ea Static single leg   3x ea Wobble board x20-30 ea 4-ways - -   Strength:   Knee extension 5# x15   6# x15  7# x15 - Seated, cuff  8# x15  10# 2x10 10# attempted , but unable,   5# 2x10 Short arc  10# cuff eccentrics  1x10, 1x6   5# conc/ecc 2x10 - Standing TKE, blue 10\"x10,   With 10\" armen step over 3x5 Machine bilat  10# 3x10,   L iso hold 10# x5;  unilat L  4# 3x10 NMES:  TKE 10\"x5',  Sitting knee ext 5# 10\"x10;  Standing TKE+blue band 10\"x10.    No stim:  Knee ext  7# x15  10# x15  12# 3x5   Knee flexion Prone  5# x15  6# x15  7# x15 - Standing  5# 1x15;  Machine  15# 3x15 Machine   15# 2x15 Standing  5# 2x15;  Machine, unilat  15# x15 ea  20# x10 ea - - Machine  bilat 20# 3x10  unilat 20# 1x10 Machine   unilat  20# x10 ea  25# 2x10 ea   Hip Abd Side-lying 2x15 ea - Side lying 1x15 ea Standing wall iso slide  2x5 ea - - - Wall iso slide  3x10 ea Wall iso slide  3x10 ea   Bridge Double leg  1x15;  Double to single leg 3x5 ea - Double leg 1x10;  Double to single 2x5 ea;  Prone plank  1x5 Double leg 1x10;  Double to single 2x5 ea - - - - Single-leg  1x10 ea   Shuttle Press   Double leg  25# 1x15  Double to single leg crossed ext   25# 1x10;  Single leg  25# 2x10 Double to single leg crossed ext   37# 1x10;  Single leg  37# 3x10 Double to single leg crossed ext  37# x10 ea  50# 2x10 ea - - Double leg press  50# x10  75# x10  With crossed ext  75# x10;  Single-leg L  37# 2x10 unilat  25# x15  50# 3x10 ea*     Crossed Extension     At wall   2x10 ea - - - Wall drill 3x10 ea -   Squat       - - -   Step up       Forward  4-in 2x10 ea  6-in 2x5 ea Forward  6-in 3x10 ea -   Straight Leg Dead Lift        10# 3x10 -     Treatment/Session Summary:    · Response to Treatment: Good performance of the exercises today. Focused on quad action. Progressed resistance training. · Communication/Consultation:  None today  · Equipment provided today:  None today  · Recommendations/Intent for next treatment session: We will continue with knee motion; progressive quad and whole LE strengthening, progressing balance and control. She has a follow up with Dr. Shanta Monroy next week. Treatment Plan of Care Effective Dates: 10/2/2019 TO 12/2/2019 (60 days). Frequency/Duration: 2 times a week for an additional 60 days       Total Treatment Billable Duration: 45 Minutes.   PT Patient Time In/Time Out  Time In: 1600  Time Out: Quinn White PT    Future Appointments   Date Time Provider Joshua Greenwood   10/17/2019  4:30 PM Ulises Wang PT [facility]   10/21/2019  4:00 PM Ulises Wang PT [facility]   10/24/2019  4:30 PM Ulises Wang PT [facility]   10/28/2019  4:00 PM Ulises Wang PT [facility]   10/31/2019  4:30 PM Ulises Wang PT [facility]   11/4/2019  4:45 PM Ulises Wang PT [facility]   11/11/2019  4:45 PM Ulises Wang PT [facility]   11/19/2019  4:00 PM Ulises Wang PT [facility]   11/21/2019  4:00 PM Carlos Herbert, PT [facility]

## 2019-10-17 ENCOUNTER — HOSPITAL ENCOUNTER (OUTPATIENT)
Dept: PHYSICAL THERAPY | Age: 34
Discharge: HOME | End: 2019-10-17
Payer: COMMERCIAL

## 2019-10-17 NOTE — PROGRESS NOTES
Brain Bucio  : 1985  Payor: Colt Infante / Plan: [address] / Product Type: PPO /  [address]  at [facility] JUAN MIGUEL SHERWOOD  [address], [address], [place] [address].  [phone]   [phone]       OUTPATIENT PHYSICAL THERAPY: Cancellation Note 10/17/2019     ICD-10: Treatment Diagnosis: Pain in left knee (M25.562); Tear of articular cartilage of left knee, current, sequela (S83.32XS); Chondromalacia, left knee (O53.002)  Precautions/Allergies: post-op precautions as ordered. From EMR: Amoxicillin and Penicillin   MD Orders: Eval and Treat; HEP, ROM, \"full motion, full strength, all modalities\" (10/8/19)   MEDICAL/REFERRING DIAGNOSIS:s/p L knee scope with open osteochondral allograft transfer to the medial femoral condyle and trochlea  L knee    DATE OF ONSET: 19  REFERRING PHYSICIAN: Wang Agarwal MD  RETURN PHYSICIAN APPOINTMENT: 19        Brain Bucio called to cancel her appointment secondary to illness. We will continue with her current treatment plan on her return.         Eleazar Chan, PT    Future Appointments   Date Time Provider Joshua Greenwood   10/21/2019  4:00 PM Yvette Course, [place] [facility]   10/24/2019  4:30 PM Yvette Course, PT [facility]   10/28/2019  4:00 PM Yvette Course, PT [facility]   10/31/2019  4:30 PM Yvette Course, PT [facility]   2019  4:45 PM Yvette Course, PT [facility]   2019  4:45 PM Yvette Course, PT [facility]   2019  4:00 PM Yvette Course, PT [facility]   2019  4:00 PM Yvette Course, PT [facility]

## 2019-10-21 ENCOUNTER — HOSPITAL ENCOUNTER (OUTPATIENT)
Dept: PHYSICAL THERAPY | Age: 34
Discharge: HOME | End: 2019-10-21
Payer: COMMERCIAL

## 2019-10-21 PROCEDURE — 97110 THERAPEUTIC EXERCISES: CPT

## 2019-10-21 NOTE — PROGRESS NOTES
Isha Noble  : 1985  Payor: [id number]ante / Plan: [address] / Product Type: PPO /  [address]  at [facility] JUAN MIGUEL SHERWOOD  [address] [facility], [address], [place] [address].  [phone]   [phone]       OUTPATIENT PHYSICAL THERAPY: Daily Treatment Note 10/21/2019  Visit Count: 26     ICD-10: Treatment Diagnosis: Pain in left knee (M25.562); Tear of articular cartilage of left knee, current, sequela (S83.32XS); Chondromalacia, left knee (M83.076)  Precautions/Allergies: post-op precautions as ordered. From EMR: Amoxicillin and Penicillin   MD Orders: Eval and Treat; HEP, ROM, \"full motion, full strength, all modalities\" (10/8/19)   MEDICAL/REFERRING DIAGNOSIS:s/p L knee scope with open osteochondral allograft transfer to the medial femoral condyle and trochlea  L knee    DATE OF ONSET: 19  REFERRING PHYSICIAN: Jose Minor MD  RETURN PHYSICIAN APPOINTMENT: 19         Pre-treatment Symptoms/Complaints: Says her daughter was sick last week and had pink eye mid week, so had to cancel. She started feeling respiratory congestion beginning Saturday, and feeling a little sick today. But no fever and she worked today. Knee is \"crunchy\" feeling today. .   Pain: Initial:   No more than mild soreness to report. Post Session:    Medications Last Reviewed: Tylenol prn 10/21/19    Updated Objective Findings:   No new measure. TREATMENT:     Therapeutic Exercise: (40 Minutes): Exercises for L quad strength using neuromuscular electrical stimulation---Christian III program 3, 10\" on 20\" off x5' each with quad set terminal extension, sitting knee extension, and standing terminal extension as indicated below. Exercises for knee and whole LE strength as per grid below. Emphasis on step up mechanics, including hip/knee connection and timing, using verbal, visual, and mirror feedback.  Improved with training, but continued to demonstrate decreased pelvic/femur/knee control and timing on the move. HEP:  She is to ice at home. She is to work on forward step up mechanics with stair walking. Rail assist is ok. She verbalizes understanding. Date:  9-3-19 Date:  9/5/19 Date:  9/9/19 Date  911!9 Date  9-17-19 Date  9-23-19 Date  9/30/19 Date  10/2/19 Date  10-14-19 Date  10/21/19   Activity/Exercise Parameters Parameters Parameters          bike L. 1 x10', rpm=60 L. 1 x10' L.1 x10' L. 1 x10' L. 1 x10' - L. 1 x6' L. 1 x10' L. 1 x10' -   ROM/Flexibiltiy Assisted as above assisted as above Active as above As above As above As above As above - Assisted as above -   Balance/Control:   Dynamic:  10-in Rey Step Over  3x5 eac  - - - Dynamic:  12-in Rey Step Over  2x5 ea Static single leg   3x ea Wobble board x20-30 ea 4-ways - - -   Strength:   Knee extension 5# x15   6# x15  7# x15 - Seated, cuff  8# x15  10# 2x10 10# attempted , but unable,   5# 2x10 Short arc  10# cuff eccentrics  1x10, 1x6   5# conc/ecc 2x10 - Standing TKE, blue 10\"x10,   With 10\" rey step over 3x5 Machine bilat  10# 3x10,   L iso hold 10# x5;  unilat L  4# 3x10 NMES:  TKE 10\"x5',  Sitting knee ext 5# 10\"x10;  Standing TKE+blue band 10\"x10. No stim:  Knee ext  7# x15  10# x15  12# 3x5 NMES:  TKE 10\"x5',  Sitting knee ext 7.5# 10\"x10;  Standing TKE+black band 10\"x10. No stim:  Knee ext   7# x15  10# x15  12. 5# x10   Knee flexion Prone  5# x15  6# x15  7# x15 - Standing  5# 1x15;  Machine  15# 3x15 Machine   15# 2x15 Standing  5# 2x15;  Machine, unilat  15# x15 ea  20# x10 ea - - Machine  bilat 20# 3x10  unilat 20# 1x10 Machine   unilat  20# x10 ea  25# 2x10 ea -   Hip Abd Side-lying 2x15 ea - Side lying 1x15 ea Standing wall iso slide  2x5 ea - - - Wall iso slide  3x10 ea Wall iso slide  3x10 ea -   Bridge Double leg  1x15;  Double to single leg 3x5 ea - Double leg 1x10;  Double to single 2x5 ea;  Prone plank  1x5 Double leg 1x10;  Double to single 2x5 ea - - - - Single-leg  1x10 ea -   Drop Development   Double leg  25# 1x15  Double to single leg crossed ext   25# 1x10;  Single leg  25# 2x10 Double to single leg crossed ext   37# 1x10;  Single leg  37# 3x10 Double to single leg crossed ext  37# x10 ea  50# 2x10 ea - - Double leg press  50# x10  75# x10  With crossed ext  75# x10;  Single-leg L  37# 2x10 unilat  25# x15  50# 3x10 ea*   -   Crossed Extension     At wall   2x10 ea - - - Wall drill 3x10 ea - -   Squat       - - - -   Step up       Forward  4-in 2x10 ea  6-in 2x5 ea Forward  6-in 3x10 ea - For Form: Forward   6-in 3x5 ea;  Retrograde   2-in 3-4x5 each  4-in 3x5   Straight Leg Dead Lift        10# 3x10 - -     Treatment/Session Summary:    · Response to Treatment: Good performance of the exercises today. Continued weakness to quads. Better performance today. Soreness was increased today. Reports increased crepitus as well. Difficulty with step up mechanics on L secondary to the weakness pattern. · Communication/Consultation:  None today  · Equipment provided today:  None today  · Recommendations/Intent for next treatment session: We will continue with knee motion; progressive quad and whole LE strengthening, progressing balance and control. Treatment Plan of Care Effective Dates: 10/2/2019 TO 12/2/2019 (60 days). Frequency/Duration: 2 times a week for an additional 60 days       Total Treatment Billable Duration: 40 Minutes.   PT Patient Time In/Time Out  Time In: 1615  Time Out: DIANE Wagner    Future Appointments   Date Time Provider Joshua Greenwood   10/24/2019  4:30 PM James Edge, PT [facility]   10/28/2019  4:00 PM James Edge, PT [facility]   10/31/2019  4:30 PM James Edge, PT [facility]   11/4/2019  4:45 PM James Edge, PT [facility]   11/11/2019  4:45 PM James Edge, PT [facility]   11/19/2019  4:00 PM James Edge, PT [facility]   11/21/2019  4:00 PM Armando Berry Hollis Sultanas, PT [facility]

## 2019-10-24 ENCOUNTER — HOSPITAL ENCOUNTER (OUTPATIENT)
Dept: PHYSICAL THERAPY | Age: 34
Discharge: HOME | End: 2019-10-24
Payer: COMMERCIAL

## 2019-10-24 PROCEDURE — 97110 THERAPEUTIC EXERCISES: CPT

## 2019-10-24 NOTE — PROGRESS NOTES
Hayes Rowland  : 1985  Payor: [id number] / Plan: [id number] / Product Type: PPO /  [address] at [facility] JUAN MIGUEL SHERWOOD  [address] [facility], [address], [address] [facility]  [phone]   [phone]       OUTPATIENT PHYSICAL THERAPY: Daily Treatment Note 10/24/2019  Visit Count: 27     ICD-10: Treatment Diagnosis: Pain in left knee (M25.562); Tear of articular cartilage of left knee, current, sequela (S83.32XS); Chondromalacia, left knee (J10.244)  Precautions/Allergies: post-op precautions as ordered. From EMR: Amoxicillin and Penicillin   MD Orders: Eval and Treat; HEP, ROM, \"full motion, full strength, all modalities\" (10/8/19)   MEDICAL/REFERRING DIAGNOSIS:s/p L knee scope with open osteochondral allograft transfer to the medial femoral condyle and trochlea  L knee    DATE OF ONSET: 19  REFERRING PHYSICIAN: Kapil Cosme MD  RETURN PHYSICIAN APPOINTMENT: 19         Pre-treatment Symptoms/Complaints: Says she has been sick with an ear infection and bronchitis. On medication. Had a short bout of fever, but no fever since Monday night. Knee is \"ok\". Pain: Initial:   No more than mild soreness to report. Post Session:    Medications Last Reviewed: Tylenol prn 10/21/19    Updated Objective Findings:   No new measure. TREATMENT:     Therapeutic Exercise: (40 Minutes): Exercises for knee and whole LE strength as per grid below. Exercises modified as indicated. Open chain knee extension machine with bilat LE lift in short range, and unilat static holds at about 30 and 45 deg flexion. Closed chain exercises with emphasis on alignment and muscle timing. Verbal, visual, and mirror feedback. HEP:  She is to ice at home.  No new HEP.r    Date:  9-3-19 Date:  19 Date:  19 Date  911!9 Date  19 Date  19 Date  19 Date  10/2/19 Date  10-14-19 Date  10/21/19 Date  10/24/19   Activity/Exercise Parameters Parameters Parameters           bike L. 1 x10', rpm=60 L. 1 x10' L.1 x10' L. 1 x10' L. 1 x10' - L. 1 x6' L. 1 x10' L. 1 x10' - -   ROM/Flexibiltiy Assisted as above assisted as above Active as above As above As above As above As above - Assisted as above - -   Balance/Control:   Dynamic:  10-in Rey Step Over  3x5 eac  - - - Dynamic:  12-in Rey Step Over  2x5 ea Static single leg   3x ea Wobble board x20-30 ea 4-ways - - - -   Strength:   Knee extension 5# x15   6# x15  7# x15 - Seated, cuff  8# x15  10# 2x10 10# attempted , but unable,   5# 2x10 Short arc  10# cuff eccentrics  1x10, 1x6   5# conc/ecc 2x10 - Standing TKE, blue 10\"x10,   With 10\" rey step over 3x5 Machine bilat  10# 3x10,   L iso hold 10# x5;  unilat L  4# 3x10 NMES:  TKE 10\"x5',  Sitting knee ext 5# 10\"x10;  Standing TKE+blue band 10\"x10. No stim:  Knee ext  7# x15  10# x15  12# 3x5 NMES:  TKE 10\"x5',  Sitting knee ext 7.5# 10\"x10;  Standing TKE+black band 10\"x10. No stim:  Knee ext   7# x15  10# x15  12. 5# x10 Machine  Bilat, short arc  10# 2x10,   unilat static holds 30/45 ext  10# x5     Knee flexion Prone  5# x15  6# x15  7# x15 - Standing  5# 1x15;  Machine  15# 3x15 Machine   15# 2x15 Standing  5# 2x15;  Machine, unilat  15# x15 ea  20# x10 ea - - Machine  bilat 20# 3x10  unilat 20# 1x10 Machine   unilat  20# x10 ea  25# 2x10 ea - Machine   unialt  25# 1x10  20# 2x10 ea   Hip Abd Side-lying 2x15 ea - Side lying 1x15 ea Standing wall iso slide  2x5 ea - - - Wall iso slide  3x10 ea Wall iso slide  3x10 ea - -   Bridge Double leg  1x15;  Double to single leg 3x5 ea - Double leg 1x10;  Double to single 2x5 ea;  Prone plank  1x5 Double leg 1x10;  Double to single 2x5 ea - - - - Single-leg  1x10 ea - Single-leg  2x10 ea   Shuttle Press   Double leg  25# 1x15  Double to single leg crossed ext   25# 1x10;  Single leg  25# 2x10 Double to single leg crossed ext   37# 1x10;  Single leg  37# 3x10 Double to single leg crossed ext  37# x10 ea  50# 2x10 ea - - Double leg press  50# x10  75# x10  With crossed ext  75# x10;  Single-leg L  37# 2x10 unilat  25# x15  50# 3x10 ea*   - unilat  50# 3x10 ea   Crossed Extension     At wall   2x10 ea - - - Wall drill 3x10 ea - - -   Squat       - - - - Wall  1x10   Step up       Forward  4-in 2x10 ea  6-in 2x5 ea Forward  6-in 3x10 ea - For Form: Forward   6-in 3x5 ea;  Retrograde   2-in 3-4x5 each  4-in 3x5 Retro  2-in 2x5 ea;  4-in 2x5 ea   Straight Leg Dead Lift        10# 3x10 - - -     Treatment/Session Summary:    · Response to Treatment: Good performance of the exercises today. Continued weakness to quads. Patellar pain with open and closed chain moves limits her. · Communication/Consultation:  None today  · Equipment provided today:  None today  · Recommendations/Intent for next treatment session: We will continue with knee motion; progressive quad and whole LE strengthening, progressing balance and control. Treatment Plan of Care Effective Dates: 10/2/2019 TO 12/2/2019 (60 days). Frequency/Duration: 2 times a week for an additional 60 days       Total Treatment Billable Duration: 40 Minutes.   PT Patient Time In/Time Out  Time In: 1318  Time Out: [address] [facility], PT    Future Appointments   Date Time Provider Joshua Greenwood   10/28/2019  4:00 PM Papo Cevallos [facility]   10/31/2019  4:30 PM DIANE Cevallos [facility]   11/4/2019  4:45 PM [facility]   11/11/2019  4:45 PM Nathalie Kelly PT [facility]   11/19/2019  4:00 PM [facility]   11/21/2019  4:00 PM [facility]

## 2019-10-28 ENCOUNTER — HOSPITAL ENCOUNTER (OUTPATIENT)
Dept: PHYSICAL THERAPY | Age: 34
Discharge: HOME | End: 2019-10-28
Payer: COMMERCIAL

## 2019-10-28 PROCEDURE — 97110 THERAPEUTIC EXERCISES: CPT

## 2019-10-28 NOTE — PROGRESS NOTES
Lola Lim  : 1985  Payor: [address] Infante / Plan: [address] / Product Type: PPO /  [address]  at [facility] JUAN MIGUEL SHERWOOD  [address] [facility], [address], [place] [address].  [phone]   [phone]       OUTPATIENT PHYSICAL THERAPY: Daily Treatment Note 10/28/2019  Visit Count: 28     ICD-10: Treatment Diagnosis: Pain in left knee (M25.562); Tear of articular cartilage of left knee, current, sequela (S83.32XS); Chondromalacia, left knee (K33.987)  Precautions/Allergies: post-op precautions as ordered. From EMR: Amoxicillin and Penicillin   MD Orders: Eval and Treat; HEP, ROM, \"full motion, full strength, all modalities\" (10/8/19)   MEDICAL/REFERRING DIAGNOSIS:s/p L knee scope with open osteochondral allograft transfer to the medial femoral condyle and trochlea  L knee    DATE OF ONSET: 19  REFERRING PHYSICIAN: Jesi Trotter MD  RETURN PHYSICIAN APPOINTMENT: 19         Pre-treatment Symptoms/Complaints: Says she feeling better today--completed all her anitbiotics and prednisone for the respiratory infection. Knee is doing \"pretty good\". Not too much crunching to it today. Pain: Initial:   No more than mild soreness to report. Post Session:    Medications Last Reviewed: 10/28/19    Updated Objective Findings:   No new measure. TREATMENT:     Bike x10' for active warm up. Therapeutic Exercise: (45 Minutes): Exercises for knee and whole LE strength, control, and balance as per grid below. Exercises modified as indicated. Dynamic control with 14-in rey step-over's with emphasis on L knee flexion in stance position through the move; wobble board control shifts in unsupported double leg stance to clockwise and counter-clockwise circles, frontal and sagittal planes. Added reactive control forward step lunge with emphasis on L LE eccentric loading and quick activation.    Open chain knee extension with neuromuscular electrical stimulation---Christian III program 3, 10\" on 20\" off with resisted sitting knee extensions as per grid, 3x5' with active ext between stimulated extension reps. Verball and visual cues for exercises, especially control exercises for alignment and corrected action. Good improvement with practice. HEP:  She is to ice at home. No new HEP. Date  10/21/19 Date  10/24/19 Date  10/28/19   Activity/Exercise      bike - - Bike x10', L.3 1/ fast/1/ slow pedal   ROM/Flexibiltiy - - -   Balance/Control:   - - 14-in Armen step over  4x5 rep ea; Wobble board 4-ways x20-30 ea;  Reactive Control Forward step lunge 3x45 each   Strength:   Knee extension NMES:  TKE 10\"x5',  Sitting knee ext 7.5# 10\"x10;  Standing TKE+black band 10\"x10. No stim:  Knee ext   7# x15  10# x15  12. 5# x10 Machine  Bilat, short arc  10# 2x10,   unilat static holds 30/45 ext  10# x5   NMES  Sitting ext  10# 2x5',   12.5# 1x5'   Knee flexion - Machine   unialt  25# 1x10  20# 2x10 ea -   Hip Abd - - -   Bridge - Single-leg  2x10 ea -   Shuttle Press - unilat  50# 3x10 ea unilat  50# 3x10 ea   Crossed Extension  - - -   Squat - Wall  1x10 -   Step up For Form: Forward   6-in 3x5 ea;  Retrograde   2-in 3-4x5 each  4-in 3x5 Retro  2-in 2x5 ea;  4-in 2x5 ea -   Straight Leg Dead Lift - - -     Treatment/Session Summary:    · Response to Treatment: Good performance of the exercises today with better L knee control in first 30-deg flexion with closed chained drills done. Good start to reactive control drills. · Communication/Consultation:  None today  · Equipment provided today:  None today  · Recommendations/Intent for next treatment session: We will continue with knee motion; progressive quad and whole LE strengthening, progressing balance and control. Treatment Plan of Care Effective Dates: 10/2/2019 TO 12/2/2019 (60 days). Frequency/Duration: 2 times a week for an additional 60 days       Total Treatment Billable Duration: 45 Minutes.   PT Patient Time In/Time Out  Time In: 1605  Time Out: 7[facility], PT    Future Appointments   Date Time Provider Joshua Greenwood   10/31/2019  4:30 PM Hiram Herndon, PT [facility]   11/4/2019  4:45 PM Hiarm Herndon, PT [facility]   11/11/2019  4:45 PM Hiram Herndon, PT [facility]   11/19/2019  4:00 PM Hiram Herndon, PT [facility]   11/21/2019  4:00 PM Hiram Herndon, PT [facility]

## 2019-10-31 ENCOUNTER — HOSPITAL ENCOUNTER (OUTPATIENT)
Dept: PHYSICAL THERAPY | Age: 34
Discharge: HOME | End: 2019-10-31
Payer: COMMERCIAL

## 2019-10-31 PROCEDURE — 97110 THERAPEUTIC EXERCISES: CPT

## 2019-10-31 NOTE — PROGRESS NOTES
Cheo Jerome  : 1985  Payor: [id number] / Plan: [id number] / Product Type: PPO /  [address]  at [facility] JUAN MIGUEL SHERWOOD  [address] [facility], [address], [place] [address].  [phone]   [phone]       OUTPATIENT PHYSICAL THERAPY: Daily Treatment Note 10/31/2019  Visit Count: 29     ICD-10: Treatment Diagnosis: Pain in left knee (M25.562); Tear of articular cartilage of left knee, current, sequela (S83.32XS); Chondromalacia, left knee (N35.271)  Precautions/Allergies: post-op precautions as ordered. From EMR: Amoxicillin and Penicillin   MD Orders: Eval and Treat; HEP, ROM, \"full motion, full strength, all modalities\" (10/8/19)   MEDICAL/REFERRING DIAGNOSIS:s/p L knee scope with open osteochondral allograft transfer to the medial femoral condyle and trochlea  L knee    DATE OF ONSET: 19  REFERRING PHYSICIAN: Khari Zafar MD  RETURN PHYSICIAN APPOINTMENT: 19         Pre-treatment Symptoms/Complaints: Says her knee is stiff feeling today. Was on her feet a lot yesterday during a school field trip. Knee is fatigued today. Pain: Initial:   No VAS. Post Session:    Medications Last Reviewed: 10/28/19    Updated Objective Findings:   No new measure. TREATMENT:     Bike x10' for active warm up. Therapeutic Exercise: (30 Minutes): Exercises for knee and whole LE strength, control, and balance as per grid below. Exercises modified as indicated. Resistance downgraded secondary to muscle fatigue. L quad strength using neuromuscular electrical stimulation---Christian III program 3, 10\" on 20\" with quad set terminal extension and sitting knee extension as indicated. Attempted UE supported stand to 1/2 kneel, but only able to do 1/4 range. [place] HEP:  She is to ice at home. No new HEP.     Date  10/21/19 Date  10/24/19 Date  10/28/19 Date  10/31/19   Activity/Exercise       bike - - Bike x10', L.3 1/ fast/1/ slow pedal -   ROM/Flexibiltiy - - - -   Balance/Control:   - - 14-in Armen step over  4x5 rep ea; Wobble board 4-ways x20-30 ea;  Reactive Control Forward step lunge 3x45 each 14-in Armen step over  4x5 rep ea;  Static [id number]\" each   Strength:   Knee extension NMES:  TKE 10\"x5',  Sitting knee ext 7.5# 10\"x10;  Standing TKE+black band 10\"x10. No stim:  Knee ext   7# x15  10# x15  12. 5# x10 Machine  Bilat, short arc  10# 2x10,   unilat static holds 30/45 ext  10# x5   NMES  Sitting ext  10# 2x5',   12.5# 1x5' NMES  Sitting ext  5# 1x7',   TKE heel [id number]';     Knee flexion - Machine   unialt  25# 1x10  20# 2x10 ea - -   Hip Abd - - - -   Bridge - Single-leg  2x10 ea - -   Shuttle Press - unilat  50# 3x10 ea unilat  50# 3x10 ea -   Crossed Extension  - - - -   Squat - Wall  1x10 - -   Step up For Form: Forward   6-in 3x5 ea;  Retrograde   2-in 3-4x5 each  4-in 3x5 Retro  2-in 2x5 ea;  4-in 2x5 ea - -   Straight Leg Dead Lift - - - -   Lunge    Reverse lunge, UE supported  x3 attempts     Treatment/Session Summary:    · Response to Treatment: Increased difficulty with the quad strengthening and knee exercises today. Modified and downgraded exercise to allow work to muscles without overload. Fatigue from yesterday's activity most likely. Much improved terminal knee extension heel lift move noted today. · Communication/Consultation:  None today  · Equipment provided today:  None today  · Recommendations/Intent for next treatment session: We will continue with knee motion; progressive quad and whole LE strengthening, progressing balance and control. Treatment Plan of Care Effective Dates: 10/2/2019 TO 12/2/2019 (60 days). Frequency/Duration: 2 times a week for an additional 60 days       Total Treatment Billable Duration: 30 Minutes.   PT Patient Time In/Time Out  Time In: 1630  Time Out: DIANE Barriga    Future Appointments   Date Time Provider Joshua Oneilli   11/4/2019  4:45 PM Gil Granda PT [facility]   11/11/2019 4:45 PM Valentina Braun, PT [facility]   11/19/2019  4:00 PM Madeleine Romeo, DIANE [facility]   11/21/2019  4:00 PM Madeleine Romeo, [facility]

## 2019-11-11 ENCOUNTER — HOSPITAL ENCOUNTER (OUTPATIENT)
Dept: PHYSICAL THERAPY | Age: 34
Discharge: HOME | End: 2019-11-11
Payer: COMMERCIAL

## 2019-11-11 PROCEDURE — 97110 THERAPEUTIC EXERCISES: CPT

## 2019-11-11 NOTE — PROGRESS NOTES
Roselie Aase  : 1985  Payor: Colt Infante / Plan: [address] / Product Type: PPO /  [address]  at [facility] JUAN MIGUEL SHERWOOD  [address] [facility], [address], [place] [address].  [phone]   [phone]       OUTPATIENT PHYSICAL THERAPY: Daily Treatment Note 2019  Visit Count: 30     ICD-10: Treatment Diagnosis: Pain in left knee (M25.562); Tear of articular cartilage of left knee, current, sequela (S83.32XS); Chondromalacia, left knee (X58.294)  Precautions/Allergies: post-op precautions as ordered. From EMR: Amoxicillin and Penicillin   MD Orders: Eval and Treat; HEP, ROM, \"full motion, full strength, all modalities\" (10/8/19)   MEDICAL/REFERRING DIAGNOSIS:s/p L knee scope with open osteochondral allograft transfer to the medial femoral condyle and trochlea  L knee    DATE OF ONSET: 19  REFERRING PHYSICIAN: Olu Allison MD  RETURN PHYSICIAN APPOINTMENT: 19         Pre-treatment Symptoms/Complaints: Says her knee is \"pretty good\" today. Not too much activity today since she has been home with a sick child. Her knee shoulder be rested. 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 Says she feels like she is able to stand on it in hyperextension closer to the normal feel. Pain: Initial:   No VAS. Post Session:    Medications Last Reviewed: 19    Updated Objective Findings:   No new measure. TREATMENT:     Bike x10' for active warm up. Therapeutic Exercise: (45 Minutes): Exercises for knee and whole LE strength, control, and balance as per grid below. Exercises modified as indicated. HEP:  No new HEP. Date  10/21/19 Date  10/24/19 Date  10/28/19 Date  10/31/19 Date  19   Activity/Exercise        bike - - Bike x10', L.3 1/ fast/1/ slow pedal - x10'   ROM/Flexibiltiy - - - - -   Balance/Control:   - - 14-in Armen step over  4x5 rep ea;   Wobble board 4-ways x20-30 ea;  Reactive Control Forward step lunge 3x45 each 14-in Armen step over  4x5 rep ea;  Static single-[id number]\" each -   Strength:   Knee extension NMES:  TKE 10\"x5',  Sitting knee ext 7.5# 10\"x10;  Standing TKE+black band 10\"x10. No stim:  Knee ext   7# x15  10# x15  12. 5# x10 Machine  Bilat, short arc  10# 2x10,   unilat static holds 30/45 ext  10# x5   NMES  Sitting ext  10# 2x5',   12.5# 1x5' NMES  Sitting ext  5# 1x7',   TKE heel [id number]'; Sitting Ext, L  7.5# cuff x15,  10# cuff     Knee flexion - Machine   unialt  25# 1x10  20# 2x10 ea - - Machine   unilat  20# x15 ea  25# x10  30# x10 ea   Hip Abd - - - - Bent knee   10\"x5;  Straight leg  10\"x5;  Hip ADD/IR  10\"x5   Bridge - Single-leg  2x10 ea - - Single-leg  2x5 ea   Shuttle Press - unilat  50# 3x10 ea unilat  50# 3x10 ea - unilat  50# x15  62# x15  75# x10    Crossed Extension  - - - - -   Squat - Wall  1x10 - - -   Step up For Form: Forward   6-in 3x5 ea;  Retrograde   2-in 3-4x5 each  4-in 3x5 Retro  2-in 2x5 ea;  4-in 2x5 ea - - Lateral   4-in 2x10 ea   Straight Leg Dead Lift - - - - 15# dumbbell  2x15   Lunge    Reverse lunge, UE supported  x3 attempts -     Treatment/Session Summary:    · Response to Treatment: Very good performance of the exercises done today. Focused on strength. Better pefrormance of quad actions. · Communication/Consultation:  None today  · Equipment provided today:  None today  · Recommendations/Intent for next treatment session: We will continue with knee motion; progressive quad and whole LE strengthening, progressing balance and control. Treatment Plan of Care Effective Dates: 10/2/2019 TO 12/2/2019 (60 days). Frequency/Duration: 2 times a week for an additional 60 days       Total Treatment Billable Duration: 45 Minutes.   PT Patient Time In/Time Out  Time In: 1955  Time Out: DIANE Zayas    Future Appointments   Date Time Provider Joshua Greenwood   11/19/2019  4:00 PM Nathalie Kelly PT [facility]   11/21/2019 4:00 PM Lynne Bonilla, Thom Kelley[facility]

## 2019-11-19 ENCOUNTER — HOSPITAL ENCOUNTER (OUTPATIENT)
Dept: PHYSICAL THERAPY | Age: 34
Discharge: HOME | End: 2019-11-19
Payer: COMMERCIAL

## 2019-11-21 ENCOUNTER — HOSPITAL ENCOUNTER (OUTPATIENT)
Dept: PHYSICAL THERAPY | Age: 34
Discharge: HOME | End: 2019-11-21
Payer: COMMERCIAL

## 2019-11-21 PROCEDURE — 97110 THERAPEUTIC EXERCISES: CPT

## 2019-11-21 NOTE — PROGRESS NOTES
Gordon Poisson  : 1985  Primary: [place]  Secondary:  [address]  at [facility] JUAN MIGUEL SHERWOOD  [address] [place] [address], [address].  [phone]   [phone]       OUTPATIENT PHYSICAL THERAPY:Progress Report 2019     ICD-10: Treatment Diagnosis: Pain in left knee (M25.562); Tear of articular cartilage of left knee, current, sequela (S83.32XS); Chondromalacia, left knee (P99.540)  Precautions/Allergies: post-op precautions as ordered. From EMR: Amoxicillin and Penicillin   MD Orders: Eval and Treat; HEP, ROM, \"full motion, full strength, WBAT\" (19) MEDICAL/REFERRING DIAGNOSIS:s/p L knee scope with open osteochondral allograft transfer to the medial femoral condyle and trochlea  L knee    DATE OF ONSET: 19  REFERRING PHYSICIAN: Quintin Sue MD  RETURN PHYSICIAN APPOINTMENT: 10/8/19     PROGRESS ASSESSMENT (19):  Ms. Cullen Jaffe has attended 31 treatments to date. She is now 5 months s/p L knee scope with removal of loose bodies, open osteochondral allograft transfer to the medial femoral condyle and trochlea. She continues to make slow, steady progress. Pain is under control. Marked crepitus to the L knee persists. L knee ROM is mildly stiff into hyperextension, but she has hyperextension now. Quad weakness is her primary impairment. This is slowly progressing. Functionally, she continues to improve with walking on level ground and stairs. She is making progress toward her goals. She will benefit from continued PT to further progress strength, control, and function, then discharge to a knee [facility]. PROBLEM LIST (Impacting functional limitations):  1. Post-op pain and swelling L knee  2. Decreased ROM L knee   3. Decreased functional strength L knee/LE   4. Decreased gait skills INTERVENTIONS PLANNED: (Treatment may consist of any combination of the following)  1. Thermal and electric modalities, manual therapies for pain.    2. Manual therapies and therapeutic exercises for ROM and strength. 3. Gait Training, Therapeutic exercises for gait and balance   TREATMENT PLAN:  Effective Dates: 10/2/2019 TO 12/2/2019 (60 days). Frequency/Duration: 2 times a week for an additional 60 days. GOALS: (Goals have been discussed and agreed upon with patient.)  Short-Term Functional Goals: Time Frame: 6 weeks  1. Report no more than 5/10 intermittent pain L knee with basic walking and ADL's, and score greater than 30/80 on the LEFS. Met 11/21/19  2. L knee PROM is greater than or equal to 0-125 degrees flexion. Met 9/5/19  3. L quad strength is 5/5 with good terminal extension strength for stability with walking and progressing into strengthening phase. Progressing, ongoing 11/21/19  4. Walking with crutches and appropriate weight bearing status with good mechanics/technique on level surfaces and up/down stairs. Met 10/2/19  Discharge Goals: Time Frame: 12 weeks  1. No more than 3/10 intermittent pain L knee with all normalized daily home and work activities, and score greater than 50/80 on the LEFS. Met 11/21/19   2. Demonstrate L knee ROM equal to the R for range to return to normalized daily home, work, and exercise activities. Progressing, nearly met 11/21/19  3. Demonstrate good functional knee strength with stair walking. Progressing, ongoing 11/21/19  4. Able to balance with good control in single-leg stand greater than 30 seconds with eyes open, greater than 10 seconds with eyes closed for improved dynamic stability. Met 11/21/19  5. Independent with [facility] for advanced knee strengthening. Progressing, ongoing 11/21/19    Updated Objective Findings:   Strength: L Knee ext: 5-  Functional Mobility: Sit to/from Stand: independent. Walking on level ground: unremarkable. Stair walking: up/down with reciprocal pattern, one rail, mild limp on L on ascent, decreased eccentric control on descent.    Balance: Static Balance in Tandem stance firm surface/eyes closed L= 22\", R=25\"; Single-leg stand: firm surface/eyes open L>30\", R= 20\", eyes closed L=10\", R=brief. OUTCOME MEASURE:   Tool Used: Lower Extremity Functional Scale (LEFS)  Score:  Initial: 18/80 Most Recent: 59/80 (11/21/19)   Interpretation of Score: 20 questions each scored on a 5 point scale with 0 representing \"extreme difficulty or unable to perform\" and 4 representing \"no difficulty\".  The lower the score, the greater the functional disability. 80/80 represents no disability.  Minimal detectable change is 9 points. MEDICAL NECESSITY:   · Patient is expected to demonstrate progress in strength, range of motion and balance to return to normalized basic mobility and her normal activity level. · Current impairments are limiting her basic mobility skills. REASON FOR SERVICES/OTHER COMMENTS:  · Patient continues to require skilled intervention due to the complexity of the surgical procedure and need for safe, progressive rehab to promote rethrn to normalized mobility and activity level.    Rehabilitation Potential For Stated Goals: Good Corrie Goldmann, PT, MSPT, OCS

## 2019-11-21 NOTE — PROGRESS NOTES
Lola Lim  : 1985  Payor: [id number] / Plan: [id number] / Product Type: PPO /  [address]  at [facility] JUAN MIGUEL SHERWOOD  [address] [facility], [address], [place] [address].  [phone]   [phone]       OUTPATIENT PHYSICAL THERAPY: Daily Treatment Note 2019  Visit Count: 31     ICD-10: Treatment Diagnosis: Pain in left knee (M25.562); Tear of articular cartilage of left knee, current, sequela (S83.32XS); Chondromalacia, left knee (G96.326)  Precautions/Allergies: post-op precautions as ordered. From EMR: Amoxicillin and Penicillin   MD Orders: Eval and Treat; HEP, ROM, \"full motion, full strength, all modalities\" (19)   MEDICAL/REFERRING DIAGNOSIS:s/p L knee scope with open osteochondral allograft transfer to the medial femoral condyle and trochlea  L knee    DATE OF ONSET: 19  REFERRING PHYSICIAN: Jesi Trotter MD  RETURN PHYSICIAN APPOINTMENT: 20        Reports having her follow up with Dr. Samir Leach. He said she just needs to get stronger. She was given a new order to continue PT. She is to follow up with him in 2 months. Pre-treatment Symptoms/Complaints: Says her knee is \"pretty good\" today. No too much soreness. Pain: Initial:   2/10 Post Session:    Medications Last Reviewed: 19    Updated Objective Findings:   Strength: L Knee ext: 5-  Functional Mobility: Sit to/from Stand: independent. Walking on level ground: unremarkable. Stair walking: up/down with reciprocal pattern, one rail, mild limp on L on ascent, decreased eccentric control on descent. Balance: Static Balance in Tandem stance firm surface/eyes closed L= 22\", R=25\"; Single-leg stand: firm surface/eyes open L>30\", R= 20\", eyes closed L=10\", R=brief.      OUTCOME MEASURE:   Tool Used: Lower Extremity Functional Scale (LEFS)  Score:  Initial:  Most Recent:  (19)   Interpretation of Score: 20 questions each scored on a 5 point scale with 0 representing \"extreme difficulty or unable to perform\" and 4 representing \"no difficulty\". The lower the score, the greater the functional disability. 80/80 represents no disability. Minimal detectable change is 9 points. TREATMENT:     Bike x10' for active warm up. Therapeutic Exercise: (30 Minutes): Exercises for knee and whole LE strength as per grid below. Exercises modified as indicated. HEP:  No new HEP. Date  10/21/19 Date  10/24/19 Date  10/28/19 Date  10/31/19 Date  11/11/19 Date  11/21/19   Activity/Exercise         bike - - Bike x10', L.3 1/ fast/1/ slow pedal - x10' x10'   ROM/Flexibiltiy - - - - -    Balance/Control:   - - 14-in Armen step over  4x5 rep ea; Wobble board 4-ways x20-30 ea;  Reactive Control Forward step lunge 3x45 each 14-in Armen step over  4x5 rep ea;  Static single-acx5j01\" each - Static single eyes open  L>30\", R=15\"; Eyes closed   L=10\"  R=brief; Tandem eyes closed  L=22\"  R=25\"   Strength:   Knee extension NMES:  TKE 10\"x5',  Sitting knee ext 7.5# 10\"x10;  Standing TKE+black band 10\"x10. No stim:  Knee ext   7# x15  10# x15  12. 5# x10 Machine  Bilat, short arc  10# 2x10,   unilat static holds 30/45 ext  10# x5   NMES  Sitting ext  10# 2x5',   12.5# 1x5' NMES  Sitting ext  5# 1x7',   TKE heel nbec8f0'; Sitting Ext, L  7.5# cuff x15,  10# cuff   Sitting Ext, L  10# cuff 2x15  12# 2x10   Knee flexion - Machine   unialt  25# 1x10  20# 2x10 ea - - Machine   unilat  20# x15 ea  25# x10  30# x10 ea Machine   unilat  25# x15  30# 2x10 ea   Hip Abd - - - - Bent knee   10\"x5;  Straight leg  10\"x5;  Hip ADD/IR  10\"x5 -   Bridge - Single-leg  2x10 ea - - Single-leg  2x5 ea -   Shuttle Press - unilat  50# 3x10 ea unilat  50# 3x10 ea - unilat  50# x15  62# x15  75# x10  unilat  62# x10  75# 2x10   Crossed Extension  - - - - - -   Squat - Wall  1x10 - - - -   Step up For Form:   Forward   6-in 3x5 ea;  Retrograde   2-in 3-4x5 each  4-in 3x5 Retro  2-in 2x5 ea;  4-in 2x5 ea - - Lateral   4-in 2x10 ea Stairs   Flight x2, one rail   Straight Leg Dead Lift - - - - 15# dumbbell  2x15 20# 2x15   Lunge    Reverse lunge, UE supported  x3 attempts - Reverse step with UE assist  2x5 ea     Treatment/Session Summary:    · Response to Treatment: Very good performance of the exercises done today. Focused on strength. She is now 5 month post op. Weakness is primary impairment. · Communication/Consultation:  None today  · Equipment provided today:  None today  · Recommendations/Intent for next treatment session: We will continue with knee motion; progressive quad and whole LE strengthening, progressing balance and control. Treatment Plan of Care Effective Dates: 10/2/2019 TO 12/2/2019 (60 days). Frequency/Duration: 2 times a week for an additional 60 days       Total Treatment Billable Duration: 30 Minutes.   PT Patient Time In/Time Out  Time In: 1355  Time Out: [address] [facility], PT    Future Appointments   Date Time Provider Joshua Greenwood   11/27/2019  2:00 PM [facility]ymar Weiss, PT [facility]   12/3/2019  4:00 PM Paticia Isela, PT [facility]   12/5/2019  4:45 PM Paticia Isela, PT [facility]   12/9/2019  4:00 PM Patimanjeeta Isela, PT [facility]   12/12/2019  4:00 PM Paticia Isela, PT [facility]   12/16/2019  4:00 PM Paticiwilfred Weiss, PT [facility]   12/19/2019  4:00 PM Paticia Isela, PT [facility]   12/23/2019 10:30 AM Paticia Isela, PT [facility]   12/26/2019 10:30 AM Paticia Isela, PT [facility]

## 2019-11-27 ENCOUNTER — HOSPITAL ENCOUNTER (OUTPATIENT)
Dept: PHYSICAL THERAPY | Age: 34
Discharge: HOME | End: 2019-11-27
Payer: COMMERCIAL

## 2019-11-27 PROCEDURE — 97110 THERAPEUTIC EXERCISES: CPT

## 2019-11-27 NOTE — PROGRESS NOTES
Dot Magaña  : 1985  Payor: [id number] / Plan: [address] / Product Type: PPO /  [address] [place]  at [facility] JUAN MIGUEL SHERWOOD  [address] [place] [address], [address], [place] [address].  [phone]   [phone]       OUTPATIENT PHYSICAL THERAPY: Daily Treatment Note 2019  Visit Count: 32     ICD-10: Treatment Diagnosis: Pain in left knee (M25.562); Tear of articular cartilage of left knee, current, sequela (S83.32XS); Chondromalacia, left knee (P57.692)  Precautions/Allergies: post-op precautions as ordered. From EMR: Amoxicillin and Penicillin   MD Orders: Eval and Treat; HEP, ROM, \"full motion, full strength, all modalities\" (19)   MEDICAL/REFERRING DIAGNOSIS:s/p L knee scope with open osteochondral allograft transfer to the medial femoral condyle and trochlea  L knee    DATE OF ONSET: 19  REFERRING PHYSICIAN: Nikolay Brenner MD  RETURN PHYSICIAN APPOINTMENT: 20        Pre-treatment Symptoms/Complaints: Says her knee is doing good. Pain: Initial:   2/10 Post Session:    Medications Last Reviewed: 19    Updated Objective Findings:  No new measure. TREATMENT:     Bike x10' for active warm up. Therapeutic Exercise: (30 Minutes): Exercises for knee and whole LE strength as per grid below. Exercises modified as indicated. HEP:  No new HEP. Date  10/21/19 Date  10/24/19 Date  10/28/19 Date  10/31/19 Date  19 Date  19 Date  19   Activity/Exercise          bike - - Bike x10', L.3 1/ fast/1/ slow pedal - x10' x10' x10'   ROM/Flexibiltiy - - - - -  -   Balance/Control:   - - 14-in Armen step over  4x5 rep ea; Wobble board 4-ways x20-30 ea;  Reactive Control Forward step lunge 3x45 each 14-in Armen step over  4x5 rep ea;  Static single-[id number]\" each - Static single eyes open  L>30\", R=15\"; Eyes closed   L=10\"  R=brief;   Tandem eyes closed  L=22\"  R=25\" Wobble board 4-ways x20-30 ea   Strength:   Knee extension NMES:  TKE 10\"x5',  Sitting knee ext 7.5# 10\"x10;  Standing TKE+black band 10\"x10. No stim:  Knee ext   7# x15  10# x15  12. 5# x10 Machine  Bilat, short arc  10# 2x10,   unilat static holds 30/45 ext  10# x5   NMES  Sitting ext  10# 2x5',   12.5# 1x5' NMES  Sitting ext  5# 1x7',   TKE heel ailn8u2'; Sitting Ext, L  7.5# cuff x15,  10# cuff   Sitting Ext, L  10# cuff 2x15  12# 2x10 Cuff  10#  1x15  12.5# 1x10  15# 1x10, 1x5   Knee flexion - Machine   unialt  25# 1x10  20# 2x10 ea - - Machine   unilat  20# x15 ea  25# x10  30# x10 ea Machine   unilat  25# x15  30# 2x10 ea unilat  30# 2x15   Hip Abd - - - - Bent knee   10\"x5;  Straight leg  10\"x5;  Hip ADD/IR  10\"x5 - Straight knee  2x15 ea   Bridge - Single-leg  2x10 ea - - Single-leg  2x5 ea - -   Shuttle Press - unilat  50# 3x10 ea unilat  50# 3x10 ea - unilat  50# x15  62# x15  75# x10  unilat  62# x10  75# 2x10 unilat  75# 3x10 ea   Crossed Extension  - - - - - -    Squat - Wall  1x10 - - - - dead lift   20# 2x15   Step up For Form: Forward   6-in 3x5 ea;  Retrograde   2-in 3-4x5 each  4-in 3x5 Retro  2-in 2x5 ea;  4-in 2x5 ea - - Lateral   4-in 2x10 ea Stairs   Flight x2, one rail Lateral  4-in 1x10 ea  6-in 2x5 ea   Straight Leg Dead Lift - - - - 15# dumbbell  2x15 20# 2x15 20# 2x15   Lunge    Reverse lunge, UE supported  x3 attempts - Reverse step with UE assist  2x5 ea -     Treatment/Session Summary:    · Response to Treatment: Very good performance of the exercises done today. Soreness and fatigue to L knee post. Difficulty with progression to 6-in lateral step ups. · Communication/Consultation:  None today  · Equipment provided today:  None today  · Recommendations/Intent for next treatment session: We will continue with knee motion; progressive quad and whole LE strengthening, progressing balance and control. Treatment Plan of Care Effective Dates: 10/2/2019 TO 12/2/2019 (60 days).   Frequency/Duration: 2 times a week for an additional 60 days       Total Treatment Billable Duration: 30 Minutes.   PT Patient Time In/Time Out  Time In: 1400  Time Out: DIANE Wagner    Future Appointments   Date Time Provider Joshua Greenwood   12/3/2019  4:00 PM Kreg Palms, PT [facility]   12/5/2019  4:45 PM Kreg Palms, PT [facility]   12/9/2019  4:00 PM Kreg Palms, PT [facility]   12/12/2019  4:00 PM Kreg Palms, PT [facility]   12/16/2019  4:00 PM Kreg Palms, PT [facility]   12/19/2019  4:00 PM Kreg Palms, PT [facility]   12/23/2019 10:30 AM Kreg Palms, PT [facility]   12/26/2019 10:30 AM Kreg Palms, PT [facility]

## 2019-12-03 ENCOUNTER — HOSPITAL ENCOUNTER (OUTPATIENT)
Dept: PHYSICAL THERAPY | Age: 34
Discharge: HOME | End: 2019-12-03
Payer: COMMERCIAL

## 2019-12-03 PROCEDURE — 97110 THERAPEUTIC EXERCISES: CPT

## 2019-12-03 NOTE — PROGRESS NOTES
Eleuterio Resendez  : 1985  Payor: Colt Infante / Plan: [id number] / Product Type: PPO /  [address] [place]  at [facility] JUAN MIGUEL SHERWOOD  [address] [facility], [address], [place] [address].  [phone]   [phone]       OUTPATIENT PHYSICAL THERAPY: Daily Treatment Note 12/3/2019  Visit Count: 33     ICD-10: Treatment Diagnosis: Pain in left knee (M25.562); Tear of articular cartilage of left knee, current, sequela (S83.32XS); Chondromalacia, left knee (Q85.372)  Precautions/Allergies: post-op precautions as ordered. From EMR: Amoxicillin and Penicillin   MD Orders: Eval and Treat; HEP, ROM, \"full motion, full strength, all modalities\" (19)   MEDICAL/REFERRING DIAGNOSIS:s/p L knee scope with open osteochondral allograft transfer to the medial femoral condyle and trochlea  L knee    DATE OF ONSET: 19  REFERRING PHYSICIAN: Aroldo Donahue MD  RETURN PHYSICIAN APPOINTMENT: 20        Pre-treatment Symptoms/Complaints: Says her knee is \"ok\". It continues to make a lot of noise, and have soreness. Having dorsal R mid foot pain the past few days. Feels it started after working in the yard blowing leaves this weekend. This is limiting her a little more today. Pain: Initial:   no VAS. Post Session:    Medications Last Reviewed: 19    Updated Objective Findings:  No new measure. TREATMENT:     Bike x10' for active warm up. Then Kinesio tape to dorsal foot with space correction web cut for swelling, and mechanical correction to arch. Instruction in tape wear and removal if skin is irritated. She verbalizes understanding. Therapeutic Exercise: (45 Minutes): Exercises for knee and whole LE strength as per grid below. Exercises modified as indicated. HEP:  No new HEP.     Date  10/21/19 Date  10/24/19 Date  10/28/19 Date  10/31/19 Date  19 Date  19 Date  19 Date  12/3/19   Activity/Exercise           bike - - Bike x10', L.3 1/ fast/1/ slow pedal - x10' x10' x10' x10'   ROM/Flexibiltiy - - - - -  - -   Balance/Control:   - - 14-in Armen step over  4x5 rep ea; Wobble board 4-ways x20-30 ea;  Reactive Control Forward step lunge 3x45 each 14-in Armen step over  4x5 rep ea;  Static single-iug4t45\" each - Static single eyes open  L>30\", R=15\"; Eyes closed   L=10\"  R=brief; Tandem eyes closed  L=22\"  R=25\" Wobble board 4-ways x20-30 ea Wobble board 4-ways x20-30 ea   Strength:   Knee extension NMES:  TKE 10\"x5',  Sitting knee ext 7.5# 10\"x10;  Standing TKE+black band 10\"x10. No stim:  Knee ext   7# x15  10# x15  12. 5# x10 Machine  Bilat, short arc  10# 2x10,   unilat static holds 30/45 ext  10# x5   NMES  Sitting ext  10# 2x5',   12.5# 1x5' NMES  Sitting ext  5# 1x7',   TKE heel soyl0v8'; Sitting Ext, L  7.5# cuff x15,  10# cuff   Sitting Ext, L  10# cuff 2x15  12# 2x10 Cuff  10#  1x15  12.5# 1x10  15# 1x10, 1x5 Machine  Bilat/L eccentric emph  10# 3x10;  Seated cuff  10# 3x15   Knee flexion - Machine   unialt  25# 1x10  20# 2x10 ea - - Machine   unilat  20# x15 ea  25# x10  30# x10 ea Machine   unilat  25# x15  30# 2x10 ea unilat  30# 2x15 unilat  30# 3x10   Hip Abd - - - - Bent knee   10\"x5;  Straight leg  10\"x5;  Hip ADD/IR  10\"x5 - Straight knee  2x15 ea -   Bridge - Single-leg  2x10 ea - - Single-leg  2x5 ea - - unilat  1x10 ea   Shuttle Press - unilat  50# 3x10 ea unilat  50# 3x10 ea - unilat  50# x15  62# x15  75# x10  unilat  62# x10  75# 2x10 unilat  75# 3x10 ea unilat  75# 3x10 ea   Crossed Extension  - - - - - -     Squat - Wall  1x10 - - - - dead lift   20# 2x15 Dead lift  20# 2x15   Step up For Form:   Forward   6-in 3x5 ea;  Retrograde   2-in 3-4x5 each  4-in 3x5 Retro  2-in 2x5 ea;  4-in 2x5 ea - - Lateral   4-in 2x10 ea Stairs   Flight x2, one rail Lateral  4-in 1x10 ea  6-in 2x5 ea -   Straight Leg Dead Lift - - - - 15# dumbbell  2x15 20# 2x15 20# 2x15 -   Lunge    Reverse lunge, UE supported  x3 attempts - Reverse step with UE assist  2x5 ea - - Treatment/Session Summary:    · Response to Treatment: Good performance of the exercises done today. Made adjustments for the dorsal foot pain. She has a podiatrist she has used in the past, and advised to do a consult regarding the R foot pain. · Communication/Consultation:  None today  · Equipment provided today:  None today  · Recommendations/Intent for next treatment session: We will continue with progressive quad and whole LE strengthening, progressing balance and control. Treatment Plan of Care Effective Dates: 10/2/2019 TO 12/2/2019 (60 days). Frequency/Duration: 2 times a week for an additional 60 days       Total Treatment Billable Duration: 45 Minutes.   PT Patient Time In/Time Out  Time In: 1600  Time Out: DIANE Wagner    Future Appointments   Date Time Provider Joshua Greenwood   12/5/2019  4:45 PM Nighat Sneed, PT [facility]   12/9/2019  4:00 PM Nighat Sneed, PT [facility]   12/12/2019  4:00 PM Nighat Sneed, PT [facility]   12/16/2019  4:00 PM Nighat Sneed, PT [facility]   12/19/2019  4:00 PM [facility]   12/23/2019 10:30 AM Nighat Sneed, PT [facility]   12/26/2019 10:30 AM [facility]

## 2019-12-05 ENCOUNTER — HOSPITAL ENCOUNTER (OUTPATIENT)
Dept: PHYSICAL THERAPY | Age: 34
Discharge: HOME | End: 2019-12-05
Payer: COMMERCIAL

## 2019-12-05 NOTE — PROGRESS NOTES
Beatrice Moore  : 1985  Payor: [id number] [facility] / Plan: [address] / Product Type: PPO /  [address]  at [address] [place] [address] Rd  [address] [facility], [address], [place] [address].  [phone]   [phone]       OUTPATIENT PHYSICAL THERAPY: Cancellation Note 2019     ICD-10: Treatment Diagnosis: Pain in left knee (M25.562); Tear of articular cartilage of left knee, current, sequela (S83.32XS); Chondromalacia, left knee (N10.852)  Precautions/Allergies: post-op precautions as ordered. From EMR: Amoxicillin and Penicillin   MD Orders: Eval and Treat; HEP, ROM, \"full motion, full strength, all modalities\" (19)   MEDICAL/REFERRING DIAGNOSIS:s/p L knee scope with open osteochondral allograft transfer to the medial femoral condyle and trochlea  L knee    DATE OF ONSET: 19  REFERRING PHYSICIAN: Cayetano Mack MD  RETURN PHYSICIAN APPOINTMENT: 20        Beatrice Moore called to cancel her appointment secondary to a work conflict. We will continue with her existing treatment plan on her return.       Yamileth Parkinson, PT    Future Appointments   Date Time Provider Joshua Greenwood   2019  4:00 PM Papo Nicholson [facility]   2019  4:00 PM [facility]   2019  4:00 PM [facility]   2019  4:00 PM Sade Mendoza, PT [facility]   2019 10:30 AM Sade Mendoza, PT [facility]   2019 10:30 AM [facility]

## 2019-12-09 ENCOUNTER — HOSPITAL ENCOUNTER (OUTPATIENT)
Dept: PHYSICAL THERAPY | Age: 34
Discharge: HOME | End: 2019-12-09
Payer: COMMERCIAL

## 2019-12-09 PROCEDURE — 97110 THERAPEUTIC EXERCISES: CPT

## 2019-12-09 NOTE — PROGRESS NOTES
Eleuterio Resendez  : 1985  Payor: Colt Infante / Plan: [address] / Product Type: PPO /  [address]  at [facility] JUAN MIGUEL SHERWOOD  [address] [facility], [address], [place] [address].  [phone]   [phone]       OUTPATIENT PHYSICAL THERAPY: Daily Treatment Note 2019  Visit Count: 34     ICD-10: Treatment Diagnosis: Pain in left knee (M25.562); Tear of articular cartilage of left knee, current, sequela (S83.32XS); Chondromalacia, left knee (N41.771)  Precautions/Allergies: post-op precautions as ordered. From EMR: Amoxicillin and Penicillin   TREATMENT PLAN:  Effective Dates: 2019 TO 2020  Frequency/Duration: 2 times per week for an additional 45 days   MEDICAL/REFERRING DIAGNOSIS:s/p L knee scope with open osteochondral allograft transfer to the medial femoral condyle and trochlea  L knee    DATE OF ONSET: 19  REFERRING PHYSICIAN: Aroldo Donahue MD MD Orders: Eval and Treat; HEP, ROM, \"full motion, full strength, all modalities\" (19)   Return MD Appointment: 2020        Pre-treatment Symptoms/Complaints: Says her knee is \"ok\". No new complaints. Continues to have crepitus and soreness. Pain: Initial:   no VAS. Post Session:    Medications Last Reviewed: 19    Updated Objective Findings:  See re-certification. TREATMENT:     Bike for active warm up  Therapeutic Exercise: (30 Minutes): Exercises for knee and whole LE strength as per grid below. Exercises modified as indicated. HEP:  No new HEP. Date  10/21/19 Date  10/24/19 Date  10/28/19 Date  10/31/19 Date  19 Date  19 Date  19 Date  12/3/19 Date  19   Activity/Exercise            bike - - Bike x10', L.3 1/ fast/1/ slow pedal - x10' x10' x10' x10' x6'   ROM/Flexibiltiy - - - - -  - - -   Balance/Control:   - - 14-in Armen step over  4x5 rep ea;   Wobble board 4-ways x20-30 ea;  Reactive Control Forward step lunge 3x45 each 14-in Armen step over  4x5 rep ea;  Static single-wek0j34\" each - Static single eyes open  L>30\", R=15\"; Eyes closed   L=10\"  R=brief; Tandem eyes closed  L=22\"  R=25\" Wobble board 4-ways x20-30 ea Wobble board 4-ways x20-30 ea Single leg with LE reach vectors 3-way 2x5 ea; Wobble board 2-ways x30 ea   Strength:   Knee extension NMES:  TKE 10\"x5',  Sitting knee ext 7.5# 10\"x10;  Standing TKE+black band 10\"x10. No stim:  Knee ext   7# x15  10# x15  12. 5# x10 Machine  Bilat, short arc  10# 2x10,   unilat static holds 30/45 ext  10# x5   NMES  Sitting ext  10# 2x5',   12.5# 1x5' NMES  Sitting ext  5# 1x7',   TKE heel tupr3p0'; Sitting Ext, L  7.5# cuff x15,  10# cuff   Sitting Ext, L  10# cuff 2x15  12# 2x10 Cuff  10#  1x15  12.5# 1x10  15# 1x10, 1x5 Machine  Bilat/L eccentric emph  10# 3x10;  Seated cuff  10# 3x15 Machine  Bilat/L eccentric emph  10# 4x5;  10# cuff 3x5   Knee flexion - Machine   unialt  25# 1x10  20# 2x10 ea - - Machine   unilat  20# x15 ea  25# x10  30# x10 ea Machine   unilat  25# x15  30# 2x10 ea unilat  30# 2x15 unilat  30# 3x10 unilat  35# 3x10 ea   Hip Abd - - - - Bent knee   10\"x5;  Straight leg  10\"x5;  Hip ADD/IR  10\"x5 - Straight knee  2x15 ea - -   Bridge - Single-leg  2x10 ea - - Single-leg  2x5 ea - - unilat  1x10 ea -   Shuttle Press - unilat  50# 3x10 ea unilat  50# 3x10 ea - unilat  50# x15  62# x15  75# x10  unilat  62# x10  75# 2x10 unilat  75# 3x10 ea unilat  75# 3x10 ea unilat  75# 3x10 ea   Crossed Extension  - - - - - -   -   Squat - Wall  1x10 - - - - dead lift   20# 2x15 Dead lift  20# 2x15 -   Step up For Form:   Forward   6-in 3x5 ea;  Retrograde   2-in 3-4x5 each  4-in 3x5 Retro  2-in 2x5 ea;  4-in 2x5 ea - - Lateral   4-in 2x10 ea Stairs   Flight x2, one rail Lateral  4-in 1x10 ea  6-in 2x5 ea - Lateral   4-in 3x10 ea   Straight Leg Dead Lift - - - - 15# dumbbell  2x15 20# 2x15 20# 2x15 - -   Lunge    Reverse lunge, UE supported  x3 attempts - Reverse step with UE assist  2x5 ea - - - Treatment/Session Summary:    · Response to Treatment: Good performance of the exercises done today. Weaker performance with quads today. · Communication/Consultation:  None today  · Equipment provided today:  None today  · Recommendations/Intent for next treatment session: We will re-certify to continue with progressive quad and whole LE strengthening, progressing balance and control until her next follow up with Dr. Cherene Dakins. Total Treatment Billable Duration: 30 Minutes.   PT Patient Time In/Time Out  Time In: 1710  Time Out: [address] [place], DIANE    Future Appointments   Date Time Provider Joshua Greenwood   12/12/2019  4:00 PM [facility], PT [facility]   12/16/2019  4:00 PM [facility]   12/19/2019  4:00 PM [facility]   12/23/2019 10:30 AM Cynthia Shelter, PT [facility]   12/26/2019 10:30 AM [facility] Shelter, PT [facility]

## 2019-12-10 NOTE — THERAPY RECERTIFICATION
Israel Neal  : 1985  Primary: [employer]  Secondary:  [address]  at [facility]OD  [address] [place] [address],  Angeles Lopez.  [phone]   [phone]       OUTPATIENT PHYSICAL THERAPY:Recertification      ICD-10: Treatment Diagnosis: Pain in left knee (M25.562); Tear of articular cartilage of left knee, current, sequela (S83.32XS); Chondromalacia, left knee (N65.837)  Precautions/Allergies: post-op precautions as ordered. From EMR: Amoxicillin and Penicillin   TREATMENT PLAN:  Effective Dates: 2019 TO 2020  Frequency/Duration: 2 times a week for an additional 45 Days   MEDICAL/REFERRING DIAGNOSIS:s/p L knee scope with open osteochondral allograft transfer to the medial femoral condyle and trochlea  L knee    DATE OF ONSET: 19  REFERRING PHYSICIAN: Ernestina Yoder MD MD Orders: Eval and Treat; HEP, ROM, \"full motion, full strength, all modalities\" (19)    Return MD Appointment:      RE-CERTIFICATION ASSESSMENT (19):  Ms. Indiana Leija has attended 34 total treatments to date. She is 6 1/2 months s/p L knee scope with removal of loose bodies, open osteochondral allograft transfer to the medial femoral condyle and trochlea. She continues to make slow, steady progress. She has soreness to the knee, but little pain. Crepitus to the L knee persists. L knee ROM is nearly full to into hyperextension. Quad weakness and whole L LE functional strength slowly progressing, but is still her primary impairment. She is making progress toward her goals. She will benefit from continued PT to further progress strength, control, and function, then discharge to a knee HEP. PROBLEM LIST (Impacting functional limitations):  1. Decreased functional strength L knee/LE  INTERVENTIONS PLANNED: (Treatment may consist of any combination of the following)  1.  Therapeutic exercises for strength, balance, and control   GOALS: (Goals have been discussed and agreed upon with patient.)  Short-Term Functional Goals:   1. Report no more than 5/10 intermittent pain L knee with basic walking and ADL's, and score greater than 30/80 on the LEFS. Met 11/21/19  2. L knee PROM is greater than or equal to 0-125 degrees flexion. Met 9/5/19  3. L quad strength is 5/5 with good terminal extension strength for stability with walking and progressing into strengthening phase. Progressing, ongoing 12/9/19  4. Walking with crutches and appropriate weight bearing status with good mechanics/technique on level surfaces and up/down stairs. Met 10/2/19  Discharge Goals: Time Frame: 6 weeks  1. No more than 3/10 intermittent pain L knee with all normalized daily home and work activities, and score greater than 50/80 on the LEFS. Met 11/21/19   2. Demonstrate L knee ROM equal to the R for range to return to normalized daily home, work, and exercise activities. Progressing, nearly met 12/9/19  3. Demonstrate good functional knee strength with stair walking. Progressing for stair descent, ongoing 12/9/19  4. Able to balance with good control in single-leg stand greater than 30 seconds with eyes open, greater than 10 seconds with eyes closed for improved dynamic stability. Met 11/21/19  5. Independent with [facility] for advanced knee strengthening. Progressing, ongoing 12/9/19    Updated Objective Findings:   Strength: L Knee ext: 5-  Functional Mobility: Sit to/from Stand: independent. Walking on level ground: unremarkable. Stair walking: up/down with reciprocal pattern, one rail, mild limp on L on ascent, decreased eccentric control on descent. Balance: Static Balance in Tandem stance firm surface/eyes closed L= 22\", R=25\"; Single-leg stand: firm surface/eyes open L>30\", R= 20\", eyes closed L=10\", R=brief.      OUTCOME MEASURE:   Tool Used: Lower Extremity Functional Scale (LEFS)  Score:  Initial: 18/80 Most Recent: 59/80 (11/21/19)   Interpretation of Score: 20 questions each scored on a 5 point scale with 0 representing \"extreme difficulty or unable to perform\" and 4 representing \"no difficulty\".  The lower the score, the greater the functional disability. 80/80 represents no disability.  Minimal detectable change is 9 points. MEDICAL NECESSITY:   · Patient is expected to demonstrate progress in strength, range of motion and balance to return to normalized basic mobility and her normal activity level. · Current impairments are limiting her basic mobility skills. REASON FOR SERVICES/OTHER COMMENTS:  · Patient continues to require skilled intervention due to the complexity of the surgical procedure and need for safe, progressive rehab to promote rethrn to normalized mobility and activity level. Rehabilitation Potential For Stated Goals: Good    Regarding [facility] therapy, I certify that the treatment plan above will be carried out by a therapist or under their direction.   Thank you for this referral,    Jean-Paul Thakkar, PT, MSPT, OCS    Referring Physician Signature: Fitz Finney MD _______________________________ Date _____________

## 2019-12-12 ENCOUNTER — HOSPITAL ENCOUNTER (OUTPATIENT)
Dept: PHYSICAL THERAPY | Age: 34
Discharge: HOME | End: 2019-12-12
Payer: COMMERCIAL

## 2019-12-12 PROCEDURE — 97110 THERAPEUTIC EXERCISES: CPT

## 2019-12-12 NOTE — PROGRESS NOTES
Zev Francis  : 1985  Payor: [employer]t Infante / Plan: [id number] / Product Type: PPO /  [address]  at [facility]  [address] [facility], [address], [place] [address].  [phone]   [phone]       OUTPATIENT PHYSICAL THERAPY: Daily Treatment Note 2019  Visit Count: 35     ICD-10: Treatment Diagnosis: Pain in left knee (M25.562); Tear of articular cartilage of left knee, current, sequela (S83.32XS); Chondromalacia, left knee (D12.810)  Precautions/Allergies: post-op precautions as ordered. From EMR: Amoxicillin and Penicillin   TREATMENT PLAN:  Effective Dates: 2019 TO 2020  Frequency/Duration: 2 times per week for an additional 45 days   MEDICAL/REFERRING DIAGNOSIS:s/p L knee scope with open osteochondral allograft transfer to the medial femoral condyle and trochlea  L knee    DATE OF ONSET: 19  REFERRING PHYSICIAN: Zohreh Boles MD MD Orders: Eval and Treat; HEP, ROM, \"full motion, full strength, all modalities\" (19)   Return MD Appointment: 2020        Pre-treatment Symptoms/Complaints: Says her knee is \"ok\". Knee is not really bothering her too much. Still walking down stairs one at a time is no rail. Pain: Initial:   no VAS. Post Session:    Medications Last Reviewed: 19    Updated Objective Findings:  See re-certification. TREATMENT:     Bike for active warm up. Therapeutic Exercise: (35 Minutes): Exercises for knee and whole LE strength as per grid below. Exercises modified as indicated. Verbal and mirror visual feedback cues for pelvic stability with lateral step up. HEP:  No new HEP.     Date  10/21/19 Date  10/24/19 Date  10/28/19 Date  10/31/19 Date  19 Date  19 Date  19 Date  12/3/19 Date  19 Date     Activity/Exercise             bike - - Bike x10', L.3 1/ fast/1/ slow pedal - x10' x10' x10' x10' x6' x10'   ROM/Flexibiltiy - - - - -  - - -    Balance/Control:   - - 14-in Armen step over  4x5 rep ea; Wobble board 4-ways x20-30 ea;  Reactive Control Forward step lunge 3x45 each 14-in Armen step over  4x5 rep ea;  Static single-[id number]\" each - Static single eyes open  L>30\", R=15\"; Eyes closed   L=10\"  R=brief; Tandem eyes closed  L=22\"  R=25\" Wobble board 4-ways x20-30 ea Wobble board 4-ways x20-30 ea Single leg with LE reach vectors 3-way 2x5 ea; Wobble board 2-ways x30 ea -   Strength:   Knee extension NMES:  TKE 10\"x5',  Sitting knee ext 7.5# 10\"x10;  Standing TKE+black band 10\"x10. No stim:  Knee ext   7# x15  10# x15  12. 5# x10 Machine  Bilat, short arc  10# 2x10,   unilat static holds 30/45 ext  10# x5   NMES  Sitting ext  10# 2x5',   12.5# 1x5' NMES  Sitting ext  5# 1x7',   TKE heel [id number]'; Sitting Ext, L  7.5# cuff x15,  10# cuff   Sitting Ext, L  10# cuff 2x15  12# 2x10 Cuff  10#  1x15  12.5# 1x10  15# 1x10, 1x5 Machine  Bilat/L eccentric emph  10# 3x10;  Seated cuff  10# 3x15 Machine  Bilat/L eccentric emph  10# 4x5;  10# cuff 3x5 unilat  10# cuff 3x15   Knee flexion - Machine   unialt  25# 1x10  20# 2x10 ea - - Machine   unilat  20# x15 ea  25# x10  30# x10 ea Machine   unilat  25# x15  30# 2x10 ea unilat  30# 2x15 unilat  30# 3x10 unilat  35# 3x10 ea unilat 35# 3x10   Hip Abd - - - - Bent knee   10\"x5;  Straight leg  10\"x5;  Hip ADD/IR  10\"x5 - Straight knee  2x15 ea - - Standing hip hike training with mirror feeback   Bridge - Single-leg  2x10 ea - - Single-leg  2x5 ea - - unilat  1x10 ea - -   Shuttle Press - unilat  50# 3x10 ea unilat  50# 3x10 ea - unilat  50# x15  62# x15  75# x10  unilat  62# x10  75# 2x10 unilat  75# 3x10 ea unilat  75# 3x10 ea unilat  75# 3x10 ea unilat  75# 3x10 eaunilat  75# 3x10 ea   Crossed Extension  - - - - - -   - -   Squat - Wall  1x10 - - - - dead lift   20# 2x15 Dead lift  20# 2x15 - Split stance   20# 3x5+ ea   Step up For Form:   Forward   6-in 3x5 ea;  Retrograde   2-in 3-4x5 each  4-in 3x5 Retro  2-in 2x5 ea;  4-in 2x5 ea - - Lateral   4-in 2x10 ea Stairs   Flight x2, one rail Lateral  4-in 1x10 ea  6-in 2x5 ea - Lateral   4-in 3x10 ea -latearl   3x5 with feedback for pelvic stabllity   Straight Leg Dead Lift - - - - 15# dumbbell  2x15 20# 2x15 20# 2x15 - - 20# 3x15   Lunge    Reverse lunge, UE supported  x3 attempts - Reverse step with UE assist  2x5 ea - - - -     Treatment/Session Summary:    · Response to Treatment: Good performance of the exercises done today. Difficulty with pelvic stability with step up on L, with compensated hip drop to prevent knee flexion. · Communication/Consultation:  None today  · Equipment provided today:  None today  · Recommendations/Intent for next treatment session: We will continue with progressive quad and whole LE strengthening, progressing balance and control until her next follow up with Dr. Cristal Saavedra. Total Treatment Billable Duration: 35 Minutes.   PT Patient Time In/Time Out  Time In: 1600  Time Out: [address], PT    Future Appointments   Date Time Provider Joshua Greenwood   12/16/2019  4:00 PM Yvette Mcdonald, PT [facility]   12/19/2019  4:00 PM Yvette Mcdonald, PT [facility]   12/23/2019 10:30 AM Yvette Mcdonald, PT [facility]   12/26/2019 10:30 AM Yvette Mcdonald, PT [facility]

## 2019-12-16 ENCOUNTER — HOSPITAL ENCOUNTER (OUTPATIENT)
Dept: PHYSICAL THERAPY | Age: 34
Discharge: HOME | End: 2019-12-16
Payer: COMMERCIAL

## 2019-12-16 PROCEDURE — 97110 THERAPEUTIC EXERCISES: CPT

## 2019-12-16 NOTE — PROGRESS NOTES
Naomi Zacarias  : 1985  Payor: Colt Infante / Plan: [address] / Product Type: PPO /  [address]  at [facility]IG[facility]EL [facility]ER[facility]OD  [address] [facility], [address], [place] [address].  [phone]   [phone]       OUTPATIENT PHYSICAL THERAPY: Daily Treatment Note 2019  Visit Count: 36     ICD-10: Treatment Diagnosis: Pain in left knee (M25.562); Tear of articular cartilage of left knee, current, sequela (S83.32XS); Chondromalacia, left knee (Y54.875)  Precautions/Allergies: post-op precautions as ordered. From EMR: Amoxicillin and Penicillin   TREATMENT PLAN:  Effective Dates: 2019 TO 2020  Frequency/Duration: 2 times per week for an additional 45 days   MEDICAL/REFERRING DIAGNOSIS:s/p L knee scope with open osteochondral allograft transfer to the medial femoral condyle and trochlea  L knee    DATE OF ONSET: 19  REFERRING PHYSICIAN: Jose D Bradley MD MD Orders: Eval and Treat; HEP, ROM, \"full motion, full strength, all modalities\" (19)   Return MD Appointment: 2020        Pre-treatment Symptoms/Complaints: Says her knee is doing \"pretty good\". No significant complaint or new report. Pain: Initial:   no VAS. Post Session:    Medications Last Reviewed: 19    Updated Objective Findings:  No new measure. TREATMENT:     Bike for active warm up. Therapeutic Exercise: (30 Minutes): Exercises for knee and whole LE strength as per grid below. Exercises modified as indicated. Verbal and mirror visual feedback cues for pelvic stability with step up moves. HEP:  No new HEP.     Date  10/21/19 Date  10/24/19 Date  10/28/19 Date  10/31/19 Date  19 Date  19 Date  19 Date  12/3/19 Date  19 Date  19 Date  19   Activity/Exercise              bike - - Bike x10', L.3 1/ fast/1/ slow pedal - x10' x10' x10' x10' x6' x10' X.8'   ROM/Flexibiltiy - - - - -  - - -  -   Balance/Control:   - - 14-in Armen step over  4x5 rep ea;  Wobble board 4-ways x20-30 ea;  Reactive Control Forward step lunge 3x45 each 14-in Armen step over  4x5 rep ea;  Static single-[id number]\" each - Static single eyes open  L>30\", R=15\"; Eyes closed   L=10\"  R=brief; Tandem eyes closed  L=22\"  R=25\" Wobble board 4-ways x20-30 ea Wobble board 4-ways x20-30 ea Single leg with LE reach vectors 3-way 2x5 ea; Wobble board 2-ways x30 ea - Wobble board 3-ways x30 ea   Strength:   Knee extension NMES:  TKE 10\"x5',  Sitting knee ext 7.5# 10\"x10;  Standing TKE+black band 10\"x10. No stim:  Knee ext   7# x15  10# x15  12. 5# x10 Machine  Bilat, short arc  10# 2x10,   unilat static holds 30/45 ext  10# x5   NMES  Sitting ext  10# 2x5',   12.5# 1x5' NMES  Sitting ext  5# 1x7',   TKE heel [id number]'; Sitting Ext, L  7.5# cuff x15,  10# cuff   Sitting Ext, L  10# cuff 2x15  12# 2x10 Cuff  10#  1x15  12.5# 1x10  15# 1x10, 1x5 Machine  Bilat/L eccentric emph  10# 3x10;  Seated cuff  10# 3x15 Machine  Bilat/L eccentric emph  10# 4x5;  10# cuff 3x5 unilat  10# cuff 3x15 unilat cuff  10# x15  12. 5# x15  15# 2x15   Knee flexion - Machine   unialt  25# 1x10  20# 2x10 ea - - Machine   unilat  20# x15 ea  25# x10  30# x10 ea Machine   unilat  25# x15  30# 2x10 ea unilat  30# 2x15 unilat  30# 3x10 unilat  35# 3x10 ea unilat 35# 3x10 unilat  35# 2x15   Hip Abd - - - - Bent knee   10\"x5;  Straight leg  10\"x5;  Hip ADD/IR  10\"x5 - Straight knee  2x15 ea - - Standing hip hike training with mirror feeback -   Bridge - Single-leg  2x10 ea - - Single-leg  2x5 ea - - unilat  1x10 ea - - -   Shuttle Press - unilat  50# 3x10 ea unilat  50# 3x10 ea - unilat  50# x15  62# x15  75# x10  unilat  62# x10  75# 2x10 unilat  75# 3x10 ea unilat  75# 3x10 ea unilat  75# 3x10 ea unilat  75# 3x10 eaunilat  75# 3x10 ea unilat  75# 2x15  82# 1x10   Crossed Extension  - - - - - -   - - -   Squat - Wall  1x10 - - - - dead lift   20# 2x15 Dead lift  20# 2x15 - Split stance   20# 3x5+ ea Dead lift  20# 3x15 Step up For Form: Forward   6-in 3x5 ea;  Retrograde   2-in 3-4x5 each  4-in 3x5 Retro  2-in 2x5 ea;  4-in 2x5 ea - - Lateral   4-in 2x10 ea Stairs   Flight x2, one rail Lateral  4-in 1x10 ea  6-in 2x5 ea - Lateral   4-in 3x10 ea lateral  3x5 with feedback for pelvic stabllity Retro  4-in 3x5, 1x10   Straight Leg Dead Lift - - - - 15# dumbbell  2x15 20# 2x15 20# 2x15 - - 20# 3x15 20# 3x15   Lunge    Reverse lunge, UE supported  x3 attempts - Reverse step with UE assist  2x5 ea - - - - -     Treatment/Session Summary:    · Response to Treatment: Good performance of the exercises done today. Difficulty with pelvic stability with step up on L, with compensated hip drop to prevent knee flexion. · Communication/Consultation:  None today  · Equipment provided today:  None today  · Recommendations/Intent for next treatment session: We will continue with progressive quad and whole LE strengthening, progressing balance and control until her next follow up with Dr. Umm Samson. Total Treatment Billable Duration: 30 Minutes.   PT Patient Time In/Time Out  Time In: 1600  Time Out: [address], PT    Future Appointments   Date Time Provider Joshua Greenwood   12/19/2019  4:00 PM Perfecto Tito, PT [facility]   12/23/2019 10:30 AM DIANE [facility]   12/26/2019 10:30 AM DIANE [facility]

## 2019-12-19 ENCOUNTER — HOSPITAL ENCOUNTER (OUTPATIENT)
Dept: PHYSICAL THERAPY | Age: 34
Discharge: HOME | End: 2019-12-19
Payer: COMMERCIAL

## 2019-12-19 PROCEDURE — 97110 THERAPEUTIC EXERCISES: CPT

## 2019-12-19 NOTE — PROGRESS NOTES
Darryl Core  : 1985  Payor: Colt Infante / Plan: [address] / Product Type: PPO /  [address]  at [facility]IG[facility]EL [facility]ER[facility]OD  [facility] [facility], [address], [place] [address].  [phone]   [phone]       OUTPATIENT PHYSICAL THERAPY: Daily Treatment Note 2019  Visit Count: 40     ICD-10: Treatment Diagnosis: Pain in left knee (M25.562); Tear of articular cartilage of left knee, current, sequela (S83.32XS); Chondromalacia, left knee (U22.689)  Precautions/Allergies: post-op precautions as ordered. From EMR: Amoxicillin and Penicillin   TREATMENT PLAN:  Effective Dates: 2019 TO 2020  Frequency/Duration: 2 times per week for an additional 45 days   MEDICAL/REFERRING DIAGNOSIS:s/p L knee scope with open osteochondral allograft transfer to the medial femoral condyle and trochlea  L knee    DATE OF ONSET: 19  REFERRING PHYSICIAN: Maksim Forrest MD MD Orders: Eval and Treat; HEP, ROM, \"full motion, full strength, all modalities\" (19)   Return MD Appointment: 2020        Pre-treatment Symptoms/Complaints: Says her knee is doing \"pretty good\". No significant complaint or new report. Pain: Initial:   no VAS. Post Session:    Medications Last Reviewed: 19    Updated Objective Findings:  No new measure. TREATMENT:     Bike for active warm up. Therapeutic Exercise: (30 Minutes): Exercises for knee and whole LE strength as per grid below. Exercises modified as indicated. Verbal and mirror visual feedback cues for pelvic stability with step up moves. HEP:  No new HEP.     Date  10/21/19 Date  10/24/19 Date  10/28/19 Date  10/31/19 Date  19 Date  19 Date  19 Date  12/3/19 Date  19 Date  19 Date  19 Date  19   Activity/Exercise               bike - - Bike x10', L.3 1/ fast/1/ slow pedal - x10' x10' x10' x10' x6' x10' X.8' x10'   ROM/Flexibiltiy - - - - -  - - -  - -   Balance/Control:   - - 14-in Armen step over  4x5 rep ea; Wobble board 4-ways x20-30 ea;  Reactive Control Forward step lunge 3x45 each 14-in Armen step over  4x5 rep ea;  Static single-[id number]\" each - Static single eyes open  L>30\", R=15\"; Eyes closed   L=10\"  R=brief; Tandem eyes closed  L=22\"  R=25\" Wobble board 4-ways x20-30 ea Wobble board 4-ways x20-30 ea Single leg with LE reach vectors 3-way 2x5 ea; Wobble board 2-ways x30 ea - Wobble board 3-ways x30 ea -   Strength:   Knee extension NMES:  TKE 10\"x5',  Sitting knee ext 7.5# 10\"x10;  Standing TKE+black band 10\"x10. No stim:  Knee ext   7# x15  10# x15  12. 5# x10 Machine  Bilat, short arc  10# 2x10,   unilat static holds 30/45 ext  10# x5   NMES  Sitting ext  10# 2x5',   12.5# 1x5' NMES  Sitting ext  5# 1x7',   TKE heel [id number]'; Sitting Ext, L  7.5# cuff x15,  10# cuff   Sitting Ext, L  10# cuff 2x15  12# 2x10 Cuff  10#  1x15  12.5# 1x10  15# 1x10, 1x5 Machine  Bilat/L eccentric emph  10# 3x10;  Seated cuff  10# 3x15 Machine  Bilat/L eccentric emph  10# 4x5;  10# cuff 3x5 unilat  10# cuff 3x15 unilat cuff  10# x15  12. 5# x15  15# 2x15 unilat cuff  10# x15  15# x10  17.5# 4x5   Knee flexion - Machine   unialt  25# 1x10  20# 2x10 ea - - Machine   unilat  20# x15 ea  25# x10  30# x10 ea Machine   unilat  25# x15  30# 2x10 ea unilat  30# 2x15 unilat  30# 3x10 unilat  35# 3x10 ea unilat 35# 3x10 unilat  35# 2x15 unilat  35# 3x10 ea   Hip Abd - - - - Bent knee   10\"x5;  Straight leg  10\"x5;  Hip ADD/IR  10\"x5 - Straight knee  2x15 ea - - Standing hip hike training with mirror feeback - Standing hip hike training with mirror feeback 3x5-10 ea   Bridge - Single-leg  2x10 ea - - Single-leg  2x5 ea - - unilat  1x10 ea - - - -   Shuttle Press - unilat  50# 3x10 ea unilat  50# 3x10 ea - unilat  50# x15  62# x15  75# x10  unilat  62# x10  75# 2x10 unilat  75# 3x10 ea unilat  75# 3x10 ea unilat  75# 3x10 ea unilat  75# 3x10 eaunilat  75# 3x10 ea unilat  75# 2x15  82# 1x10 unilat  82# 3x10 Crossed Extension  - - - - - -   - - - -   Squat - Wall  1x10 - - - - dead lift   20# 2x15 Dead lift  20# 2x15 - Split stance   20# 3x5+ ea Dead lift  20# 3x15 Dead lift  25# 1x15   Step up For Form: Forward   6-in 3x5 ea;  Retrograde   2-in 3-4x5 each  4-in 3x5 Retro  2-in 2x5 ea;  4-in 2x5 ea - - Lateral   4-in 2x10 ea Stairs   Flight x2, one rail Lateral  4-in 1x10 ea  6-in 2x5 ea - Lateral   4-in 3x10 ea lateral  3x5 with feedback for pelvic stabllity Retro  4-in 3x5, 1x10 Lateral  4-in 3x5 ea   Straight Leg Dead Lift - - - - 15# dumbbell  2x15 20# 2x15 20# 2x15 - - 20# 3x15 20# 3x15 -   Lunge    Reverse lunge, UE supported  x3 attempts - Reverse step with UE assist  2x5 ea - - - - - B UE assisted reverse step lunge   3x5 ea     Treatment/Session Summary:    · Response to Treatment: Good performance of the exercises done today. · Communication/Consultation:  None today  · Equipment provided today:  None today  · Recommendations/Intent for next treatment session: We will continue with progressive quad and whole LE strengthening, progressing balance and control until her next follow up with Dr. Nieves Bautista. Total Treatment Billable Duration: 30 Minutes.   PT Patient Time In/Time Out  Time In: 1610  Time Out: DIANE Wagner    Future Appointments   Date Time Provider Joshua Greenwood   12/26/2019 10:30 AM Geroge Phalen, Nonie Neas, PT [facility]

## 2019-12-23 ENCOUNTER — APPOINTMENT (OUTPATIENT)
Dept: PHYSICAL THERAPY | Age: 34
End: 2019-12-23
Payer: COMMERCIAL

## 2019-12-26 ENCOUNTER — HOSPITAL ENCOUNTER (OUTPATIENT)
Dept: PHYSICAL THERAPY | Age: 34
Discharge: HOME | End: 2019-12-26
Payer: COMMERCIAL

## 2019-12-26 PROCEDURE — 97110 THERAPEUTIC EXERCISES: CPT

## 2019-12-26 NOTE — PROGRESS NOTES
Caitlin Hall  : 1985  Payor: Colt Infante / Plan: [address] / Product Type: PPO /  [address] at [facility] JUAN MIGUEL SHERWOOD  [address] [facility], [address], [address] [facility]  [phone]   [phone]       OUTPATIENT PHYSICAL THERAPY: Daily Treatment Note 2019  Visit Count: 38     ICD-10: Treatment Diagnosis: Pain in left knee (M25.562); Tear of articular cartilage of left knee, current, sequela (S83.32XS); Chondromalacia, left knee (Q17.814)  Precautions/Allergies: post-op precautions as ordered. From EMR: Amoxicillin and Penicillin   TREATMENT PLAN:  Effective Dates: 2019 TO 2020  Frequency/Duration: 2 times per week for an additional 45 days   MEDICAL/REFERRING DIAGNOSIS:s/p L knee scope with open osteochondral allograft transfer to the medial femoral condyle and trochlea  L knee    DATE OF ONSET: 19  REFERRING PHYSICIAN: Kit Zhou MD MD Orders: Eval and Treat; HEP, ROM, \"full motion, full strength, all modalities\" (19)   Return MD Appointment: 2020        Pre-treatment Symptoms/Complaints: Says her knee is doing \"pretty good\". Just feels stiff this morning. Pain: Initial:   no VAS. Post Session:    Medications Last Reviewed: 19    Updated Objective Findings:  No new measure. TREATMENT:     Bike for active warm up. Therapeutic Exercise: (45 Minutes): Exercises for knee and whole LE strength as per grid below. Exercises modified as indicated. Verbal and mirror visual feedback cues for pelvic stability with step up moves. HEP:  No new HEP.     Date  10/21/19 Date  10/24/19 Date  10/28/19 Date  10/31/19 Date  19 Date  19 Date  19 Date  12/3/19 Date  19 Date  19 Date  19 Date  19 Date  19   Activity/Exercise                bike - - Bike x10', L.3 1/ fast/1/ slow pedal - x10' x10' x10' x10' x6' x10' X.8' x10' x8'   ROM/Flexibiltiy - - - - -  - - -  - -    Balance/Control:   - - 14-in Armen step over  4x5 rep ea; Wobble board 4-ways x20-30 ea;  Reactive Control Forward step lunge 3x5 each 14-in Armen step over  4x5 rep ea;  Static single-pvo2x05\" each - Static single eyes open  L>30\", R=15\"; Eyes closed   L=10\"  R=brief; Tandem eyes closed  L=22\"  R=25\" Wobble board 4-ways x20-30 ea Wobble board 4-ways x20-30 ea Single leg with LE reach vectors 3-way 2x5 ea; Wobble board 2-ways x30 ea - Wobble board 3-ways x30 ea - Wobble board 3-ways x30 ea;  Reactive Control Forward step lunge 4x5 each;  Single leg with   LE reach 6 ways  X4 ea   Strength:   Knee [id number] NMES:  TKE 10\"x5',  Sitting knee ext 7.5# 10\"x10;  Standing TKE+black band 10\"x10. No stim:  Knee ext   7# x15  10# x15  12. 5# x10 Machine  Bilat, short arc  10# 2x10,   unilat static holds 30/45 ext  10# x5   NMES  Sitting ext  10# 2x5',   12.5# 1x5' NMES  Sitting ext  5# 1x7',   TKE heel [id number]'; Sitting Ext, L  7.5# cuff x15,  10# cuff   Sitting Ext, L  10# cuff 2x15  12# 2x10 Cuff  10#  1x15  12.5# 1x10  15# 1x10, 1x5 Machine  Bilat/L eccentric emph  10# 3x10;  Seated cuff  10# 3x15 Machine  Bilat/L eccentric emph  10# 4x5;  10# cuff 3x5 unilat  10# cuff 3x15 unilat cuff  10# x15  12. 5# x15  15# 2x15 unilat cuff  10# x15  15# x10  17.5# 4x5 unilat cuff  15# x15  17.5# 3x10   Knee flexion - Machine   unialt  25# 1x10  20# 2x10 ea - - Machine   unilat  20# x15 ea  25# x10  30# x10 ea Machine   unilat  25# x15  30# 2x10 ea unilat  30# 2x15 unilat  30# 3x10 unilat  35# 3x10 ea unilat 35# 3x10 unilat  35# 2x15 unilat  35# 3x10 ea unilat  35# 2x10   Hip Abd - - - - Bent knee   10\"x5;  Straight leg  10\"x5;  Hip ADD/IR  10\"x5 - Straight knee  2x15 ea - - Standing hip hike training with mirror feeback - Standing hip hike training with mirror feeback 3x5-10 ea Side lying 1x15 ea   Bridge - Single-leg  2x10 ea - - Single-leg  2x5 ea - - unilat  1x10 ea - - - - unilat  3x10 ea   Shuttle Press - unilat  50# 3x10 ea unilat  50# 3x10 ea - unilat  50# x15  62# x15  75# x10  unilat  62# x10  75# 2x10 unilat  75# 3x10 ea unilat  75# 3x10 ea unilat  75# 3x10 ea unilat  75# 3x10 eaunilat  75# 3x10 ea unilat  75# 2x15  82# 1x10 unilat  82# 3x10 unilat  82# 3x5 L   Crossed Extension  - - - - - -   - - - -    Squat - Wall  1x10 - - - - dead lift   20# 2x15 Dead lift  20# 2x15 - Split stance   20# 3x5+ ea Dead lift  20# 3x15 Dead lift  25# 1x15 Dead Lift  25# 3x10   Step up For Form: Forward   6-in 3x5 ea;  Retrograde   2-in 3-4x5 each  4-in 3x5 Retro  2-in 2x5 ea;  4-in 2x5 ea - - Lateral   4-in 2x10 ea Stairs   Flight x2, one rail Lateral  4-in 1x10 ea  6-in 2x5 ea - Lateral   4-in 3x10 ea lateral  3x5 with feedback for pelvic stabllity Retro  4-in 3x5, 1x10 Lateral  4-in 3x5 ea -   Straight Leg Dead Lift - - - - 15# dumbbell  2x15 20# 2x15 20# 2x15 - - 20# 3x15 20# 3x15 - -   Lunge    Reverse lunge, UE supported  x3 attempts - Reverse step with UE assist  2x5 ea - - - - - B UE assisted reverse step lunge   3x5 ea -     Treatment/Session Summary:    · Response to Treatment: Good performance of the exercises done today. Muscle fatigue with the exercises done. · Communication/Consultation:  None today  · Equipment provided today:  None today  · Recommendations/Intent for next treatment session: We will continue with progressive quad and whole LE strengthening, progressing balance and control. She is to work on [employer] next week while PT is out of town. Total Treatment Billable Duration: 45 Minutes.   PT Patient Time In/Time Out  Time In: 1030  Time Out: [address], PT    Future Appointments   Date Time Provider Joshua Oneilli   1/6/2020  4:00 PM [facility]   1/9/2020  4:00 PM Hiram Herndon PT [facility]   1/13/2020  4:00 PM Hiram Herndon PT [facility]   1/16/2020  4:00 PM [facility]

## 2020-01-06 ENCOUNTER — HOSPITAL ENCOUNTER (OUTPATIENT)
Dept: PHYSICAL THERAPY | Age: 35
Discharge: HOME | End: 2020-01-06
Payer: COMMERCIAL

## 2020-01-06 PROCEDURE — 97110 THERAPEUTIC EXERCISES: CPT

## 2020-01-06 NOTE — PROGRESS NOTES
Christopher Sha  : 1985  Payor: Hemal Mccormack / Plan: [place] / Product Type: Commerical /  [address]  at [facility] JUAN MIGUEL SHERWOOD  [address] [facility], [address], [address] [place] [address]  [phone]   [phone]       OUTPATIENT PHYSICAL THERAPY: Daily Treatment Note 2020  Visit Count: 39     ICD-10: Treatment Diagnosis: Pain in left knee (M25.562); Tear of articular cartilage of left knee, current, sequela (S83.32XS); Chondromalacia, left knee (A25.834)  Precautions/Allergies: post-op precautions as ordered. From EMR: Amoxicillin and Penicillin   TREATMENT PLAN:  Effective Dates: 2019 TO 2020  Frequency/Duration: 2 times per week for an additional 45 days   MEDICAL/REFERRING DIAGNOSIS:s/p L knee scope with open osteochondral allograft transfer to the medial femoral condyle and trochlea  L knee    DATE OF ONSET: 19  REFERRING PHYSICIAN: Za Butler MD MD Orders: Eval and Treat; HEP, ROM, \"full motion, full strength, all modalities\" (19)   Return MD Appointment: 2020        Pre-treatment Symptoms/Complaints: No new complaints to L knee. Pain: Initial:   no VAS. Post Session:    Medications Last Reviewed: 20    Updated Objective Findings:  No new measure. TREATMENT:     Elliptical  for active warm up. Therapeutic Exercise: (40 Minutes): Exercises for knee and whole LE strength as per grid below. Exercises modified as indicated. Verbal and mirror visual feedback cues for pelvic stability with step up moves. HEP:  No new HEP.     Date  19 Date  19 Date  19 Date  20   Activity/Exercise       bike X.8' x10' x8' Elliptical  L. 3 x10'   ROM/Flexibiltiy - -  -   Balance/Control:   Wobble board 3-ways x30 ea - Wobble board 3-ways x30 ea;  Reactive Control Forward step lunge 4x5 each;  Single leg with   LE reach 6 ways  X4 ea Single leg with   LE reach  3-way   x5 ea   Strength:   Knee [id number] unilat cuff  10# x15  12. 5# x15  15# 2x15 unilat cuff  10# x15  15# x10  17.5# 4x5 unilat cuff  15# x15  17.5# 3x10 Machine  bilat  10# x15  unilat eccentric  10# 4x5 L   Knee flexion unilat  35# 2x15 unilat  35# 3x10 ea unilat  35# 2x10 -   Hip Abd - Standing hip hike training with mirror feeback 3x5-10 ea Side lying 1x15 ea -   Bridge - - unilat  3x10 ea Single-leg  3x10 ea;  Prone plank  10\"x5;  Side-plank 2x5 ea   Shuttle Press unilat  75# 2x15  82# 1x10 unilat  82# 3x10 unilat  82# 3x5 L unilat   Side press 25# 3x10  Supine press  75# 3x6   Crossed Extension  - -  Lower ab level 3 with UE assist x10   Squat Dead lift  20# 3x15 Dead lift  25# 1x15 Dead Lift  25# 3x10 -   Step up Retro  4-in 3x5, 1x10 Lateral  4-in 3x5 ea - -   Straight Leg Dead Lift 20# 3x15 - - -   Lunge - B UE assisted reverse step lunge   3x5 ea - -     Treatment/Session Summary:    · Response to Treatment: Good effort and performance of the exercises done today. Improved quad action with the unilat eccentric lifts on the knee extension machine. Quad and whole LE muscle weakness in eccentric loading action noted with the reactive control forward lunge step drill and the single-leg LE reach vectors. · Communication/Consultation:  None today  · Equipment provided today:  None today  · Recommendations/Intent for next treatment session: We will continue with progressive quad and whole LE strengthening, progressing balance and control. Total Treatment Billable Duration: 40 Minutes.   PT Patient Time In/Time Out  Time In: 1600  Time Out: [address], PT    Future Appointments   Date Time Provider Joshua Greenwood   1/9/2020  4:00 PM [facility]   1/13/2020  4:00 PM Glen Villafuerte PT [facility]   1/16/2020  4:00 PM Glen Villafuerte PT [facility]

## 2020-01-09 ENCOUNTER — APPOINTMENT (OUTPATIENT)
Dept: PHYSICAL THERAPY | Age: 35
End: 2020-01-09
Payer: COMMERCIAL

## 2020-01-16 ENCOUNTER — HOSPITAL ENCOUNTER (OUTPATIENT)
Dept: PHYSICAL THERAPY | Age: 35
Discharge: HOME | End: 2020-01-16
Payer: COMMERCIAL

## 2020-01-16 PROCEDURE — 97110 THERAPEUTIC EXERCISES: CPT

## 2020-01-16 NOTE — PROGRESS NOTES
Stefanie Soto  : 1985  Payor: Sav Kaiser / Plan: [place] / Product Type: Commerical /  [address]  at [facility] JUAN MIGUEL SHERWODO  [address] [place] [address], [address], [address] [place] [address]  [phone]   [phone]       OUTPATIENT PHYSICAL THERAPY: Daily Treatment Note 2020  Visit Count: 40     ICD-10: Treatment Diagnosis: Pain in left knee (M25.562); Tear of articular cartilage of left knee, current, sequela (S83.32XS); Chondromalacia, left knee (U11.083)  Precautions/Allergies: post-op precautions as ordered. From EMR: Amoxicillin and Penicillin   TREATMENT PLAN:  Effective Dates: 2019 TO 2020  Frequency/Duration: 2 times per week for an additional 45 days   MEDICAL/REFERRING DIAGNOSIS:s/p L knee scope with open osteochondral allograft transfer to the medial femoral condyle and trochlea  L knee    DATE OF ONSET: 19  REFERRING PHYSICIAN: Epi Stubbs MD MD Orders: Eval and Treat; HEP, ROM, \"full motion, full strength, all modalities\" (19)   Return MD Appointment: 2020        Pre-treatment Symptoms/Complaints: Says her L knee is \"ok\". Not much pain now. Continues to crunch a lot but not having the big pop that it used to do. Says she and her  joined a gym so she can continue to work out. Feels she can continue to work on exercises like we do in PT. Pain: Initial:   no VAS. Post Session:    Medications Last Reviewed: 20    Updated Objective Findings:   Strength: L Knee ext: 5-  Functional Mobility: Sit to/from Stand: independent. Walking on level ground: unremarkable.  Stair walking: up/down with reciprocal pattern, one rail, mild limp on L on ascent and descent.   Balance: Static Balance in Tandem stance firm surface/eyes closed L and R to 30\";  Single-leg stand: firm surface/eyes open L>30\", eyes closed L=10\"     OUTCOME MEASURE:   Tool Used: Lower Extremity Functional Scale (LEFS)  Score:  Initial:  59/80 (19) Most Recent: 64/80 (1/16/20)   Interpretation of Score: 20 questions each scored on a 5 point scale with 0 representing \"extreme difficulty or unable to perform\" and 4 representing \"no difficulty\".  The lower the score, the greater the functional disability. 80/80 represents no disability.  Minimal detectable change is 9 points. TREATMENT:     Elliptical  for active warm up. Therapeutic Exercise: (40 Minutes): Performed and reviewed exercises per grid below for strengthening to continue at [facility]. Exercises as indicated. Good understanding and return demonstration. HEP:  Verbal instruction for strength routine to continue in gym as done today. She verbalizes understanding. Date  12/16/19 Date  12/19/19 Date  12/26/19 Date  1/6/20 Date  1/16/20   Activity/Exercise        bike X.8' x10' x8' Elliptical  L. 3 x10' Elliptical  L 3 x10'   ROM/Flexibiltiy - -  -    Balance/Control:   Wobble board 3-ways x30 ea - Wobble board 3-ways x30 ea;  Reactive Control Forward step lunge 4x5 each;  Single leg with   LE reach 6 ways  X4 ea Single leg with   LE reach  3-way   x5 ea    Strength:   Knee [id number] unilat cuff  10# x15  12. 5# x15  15# 2x15 unilat cuff  10# x15  15# x10  17.5# 4x5 unilat cuff  15# x15  17.5# 3x10 Machine  bilat  10# x15  unilat eccentric  10# 4x5 L Machine  unilat eccentric  10# 3x10   Knee flexion unilat  35# 2x15 unilat  35# 3x10 ea unilat  35# 2x10 - unilat  35# 3x10   Hip Abd - Standing hip hike training with mirror feeback 3x5-10 ea Side lying 1x15 ea - Standing hip abd iso wall slide  2x10 ea   Bridge - - unilat  3x10 ea Single-leg  3x10 ea;  Prone plank  10\"x5;  Side-plank 2x5 ea Single-leg  3x10 ea;  Prone plank  10\"x5   Shuttle Press unilat  75# 2x15  82# 1x10 unilat  82# 3x10 unilat  82# 3x5 L unilat   Side press 25# 3x10  Supine press  75# 3x6 Supine press  75# 3x6   Crossed Extension  - -  Lower ab level 3 with UE assist x10 -   Squat Dead lift  20# 3x15 Dead lift  25# 1x15 Dead Lift  25# 3x10 - -   Step up Retro  4-in 3x5, 1x10 Lateral  4-in 3x5 ea - - Lateral  4-in 3x5 ea   Straight Leg Dead Lift 20# 3x15 - - - -   Lunge - B UE assisted reverse step lunge   3x5 ea - - Wall lunge 2x5 ea     Treatment/Session Summary:    · Response to Treatment: Good effort and performance of the exercises done today. She feels ready to continue on her own. She is now 6 1/2 months post op. Quad weakness persists, but it is better. · Communication/Consultation:  None today  · Equipment provided today:  None today  · Recommendations/Intent for next treatment session: She is to follow up with Dr. Ivette Nguyen on Monday. Will plan for discharge to [facility]. Total Treatment Billable Duration: 40 Minutes. PT Patient Time In/Time Out  Time In: 1600  Time Out: [address], PT    No future appointments.

## 2020-01-17 NOTE — PROGRESS NOTES
Katina Landeros  : 1985  Primary: [address] at [facility] JUAN MIGUEL SHERWOOD  [address] [facility], [address]  [phone]   [phone]       OUTPATIENT PHYSICAL THERAPY:Progress Report 2020     ICD-10: Treatment Diagnosis: Pain in left knee (M25.562); Tear of articular cartilage of left knee, current, sequela (S83.32XS); Chondromalacia, left knee (O25.189)  Precautions/Allergies: post-op precautions as ordered. From EMR: Amoxicillin and Penicillin   TREATMENT PLAN:  Effective Dates: 2019 TO 2020  Frequency/Duration: 2 times a week for an additional 45 Days   MEDICAL/REFERRING DIAGNOSIS:s/p L knee scope with open osteochondral allograft transfer to the medial femoral condyle and trochlea  L knee    DATE OF ONSET: 19  REFERRING PHYSICIAN: Lucina Sampson MD MD Orders: Eval and Treat; HEP, ROM, \"full motion, full strength, all modalities\" (19)    Return MD Appointment: 2020     PROGRESS ASSESSMENT (20):  Ms. Jorge Love has attended 40 treatments. She is now nearly 7 months s/p L knee scope with removal of loose bodies, open osteochondral allograft transfer to the medial femoral condyle and trochlea. She continues to make steady progress. Pain is under control. Crepitus to the L knee persists. L knee ROM is grossly equal R. Quad weakness persits, but this is improved. She has continued to make progress toward her goals. She has an advanced HEP for strengthening to continue to work on at her gym. She feels comfortable with continuing on her own at this time and ready for discharge. GOALS: (Goals have been discussed and agreed upon with patient.)  Short-Term Functional Goals:   1. Report no more than 5/10 intermittent pain L knee with basic walking and ADL's, and score greater than 30/80 on the LEFS. Met 19  2. L knee PROM is greater than or equal to 0-125 degrees flexion. Met 19  3.  L quad strength is 5/5 with good terminal extension strength for stability with walking and progressing into strengthening phase. Met 1/16/20  4. Walking with crutches and appropriate weight bearing status with good mechanics/technique on level surfaces and up/down stairs. Met 10/2/19  Discharge Goals: Time Frame: 6 weeks  1. No more than 3/10 intermittent pain L knee with all normalized daily home and work activities, and score greater than 50/80 on the LEFS. Met 11/21/19   2. Demonstrate L knee ROM equal to the R for range to return to normalized daily home, work, and exercise activities. Nearly met 1/16/20  3. Demonstrate good functional knee strength with stair walking. Met 1/16/20  4. Able to balance with good control in single-leg stand greater than 30 seconds with eyes open, greater than 10 seconds with eyes closed for improved dynamic stability. Met 11/21/19  5. Independent with [facility] for advanced knee strengthening. Met 1/16/20    Updated Objective Findings:   Strength: L Knee ext: 5-  Functional Mobility: Sit to/from Stand: independent. Walking on level ground: unremarkable. Stair walking: up/down with reciprocal pattern, one rail, mild limp on L on ascent, decreased eccentric control on descent. Balance: Static Balance in Tandem stance firm surface/eyes closed L= 22\", R=25\"; Single-leg stand: firm surface/eyes open L>30\", R= 20\", eyes closed L=10\", R=brief. OUTCOME MEASURE:   Tool Used: Lower Extremity Functional Scale (LEFS)  Score:  Initial: 18/80 Most Recent: 59/80 (11/21/19)   Interpretation of Score: 20 questions each scored on a 5 point scale with 0 representing \"extreme difficulty or unable to perform\" and 4 representing \"no difficulty\".  The lower the score, the greater the functional disability. 80/80 represents no disability.  Minimal detectable change is 9 points. REASON FOR SERVICES/OTHER COMMENTS:  · Ready for discharge to advanced strength HEP.      Lindsay Neighbours, PT, MSPT, OCS

## 2020-02-04 NOTE — THERAPY DISCHARGE
Heribertojoyce Meyer  : 1985  Primary: [address] at [facility] JUAN MIGUEL SHERWOOD  [address] [facility], [address]  [phone]   [phone]       OUTPATIENT PHYSICAL THERAPY:Discharge Summary 2/3/2020     ICD-10: Treatment Diagnosis: Pain in left knee (M25.562); Tear of articular cartilage of left knee, current, sequela (S83.32XS); Chondromalacia, left knee (W25.958)  Precautions/Allergies: post-op precautions as ordered. From EMR: Amoxicillin and Penicillin      MEDICAL/REFERRING DIAGNOSIS:s/p L knee scope with open osteochondral allograft transfer to the medial femoral condyle and trochlea  L knee    DATE OF ONSET: 19  REFERRING PHYSICIAN: Andreas Hoffman MD MD Orders: Eval and Treat; HEP, ROM, \"full motion, full strength, all modalities\" (19)       DISCHARGE ASSESSMENT:  Ms. Reeder Bon stopped by the clinic today after her follow up with Dr. Angelica Díaz. He has released her. She says she has been doing the gym exercises we went through on her last visit, and feels comfortable doing these on her own. She attended 40 total treatments for the rehab s/p L knee scope with removal of loose bodies, open osteochondral allograft transfer to the medial femoral condyle and trochlea. She made steady progress through the course of her treatment. On her last visit, on 20, she reported pain under control and persistent crepitus. L knee ROM is grossly equal R. Quad weakness persisted, but improved overall. She nearly met all of her goals. She has an advanced HEP for strengthening to continue to work on at her gym. She feels comfortable with continuing on her own at this time and ready for discharge. GOALS: (Goals have been discussed and agreed upon with patient.)  Short-Term Functional Goals:   1. Report no more than 5/10 intermittent pain L knee with basic walking and ADL's, and score greater than 30/80 on the LEFS. Met 19  2.  L knee PROM is greater than or equal to 0-125 degrees flexion. Met 9/5/19  3. L quad strength is 5/5 with good terminal extension strength for stability with walking and progressing into strengthening phase. Met 1/16/20  4. Walking with crutches and appropriate weight bearing status with good mechanics/technique on level surfaces and up/down stairs. Met 10/2/19  Discharge Goals: Time Frame: 6 weeks  1. No more than 3/10 intermittent pain L knee with all normalized daily home and work activities, and score greater than 50/80 on the LEFS. Met 11/21/19   2. Demonstrate L knee ROM equal to the R for range to return to normalized daily home, work, and exercise activities. Nearly met 1/16/20  3. Demonstrate good functional knee strength with stair walking. Met 1/16/20  4. Able to balance with good control in single-leg stand greater than 30 seconds with eyes open, greater than 10 seconds with eyes closed for improved dynamic stability. Met 11/21/19  5. Independent with [facility] for advanced knee strengthening. Met 1/16/20    Updated Objective Findings:   Strength: L Knee: 5 deg hyperextension  Functional Mobility: Sit to/from Stand: independent. Walking on level ground: unremarkable. Stair walking: up/down with reciprocal pattern, one rail, mild limp on L on ascent, decreased eccentric control on descent. Balance: Static Balance in Tandem stance firm surface/eyes closed L= 22\", R=25\"; Single-leg stand: firm surface/eyes open L>30\", R= 20\", eyes closed L=10\", R=brief. OUTCOME MEASURE:   Tool Used: Lower Extremity Functional Scale (LEFS)  Score:  Initial: 18/80 Most Recent: 59/80 (11/21/19)   Interpretation of Score: 20 questions each scored on a 5 point scale with 0 representing \"extreme difficulty or unable to perform\" and 4 representing \"no difficulty\".  The lower the score, the greater the functional disability. 80/80 represents no disability.  Minimal detectable change is 9 points.     REASON FOR SERVICES/OTHER COMMENTS: I will plan to discharge Valdo Copeland from Physical Therapy at this time. She is to continue with her HEP as instructed. I will be happy to follow up with her if there is a need for Physical Therapy in the future.     Sri Luis, PT, MSPT, OCS

## 2022-03-18 PROBLEM — M23.42 BODIES, LOOSE, JOINT, KNEE, LEFT: Status: ACTIVE | Noted: 2019-06-20

## 2022-03-20 PROBLEM — S83.32XA TEAR OF ARTICULAR CARTILAGE OF LEFT KNEE AS CURRENT INJURY: Status: ACTIVE | Noted: 2019-06-18

## 2023-08-03 ENCOUNTER — TELEPHONE (OUTPATIENT)
Dept: ORTHOPEDIC SURGERY | Age: 38
End: 2023-08-03

## 2023-08-08 ENCOUNTER — OFFICE VISIT (OUTPATIENT)
Dept: ORTHOPEDIC SURGERY | Age: 38
End: 2023-08-08
Payer: COMMERCIAL

## 2023-08-08 VITALS — HEIGHT: 65 IN | WEIGHT: 260 LBS | BODY MASS INDEX: 43.32 KG/M2

## 2023-08-08 DIAGNOSIS — S83.92XA SPRAIN OF LEFT KNEE, UNSPECIFIED LIGAMENT, INITIAL ENCOUNTER: Primary | ICD-10-CM

## 2023-08-08 DIAGNOSIS — M17.12 PRIMARY OSTEOARTHRITIS OF LEFT KNEE: ICD-10-CM

## 2023-08-08 PROCEDURE — 99204 OFFICE O/P NEW MOD 45 MIN: CPT | Performed by: ORTHOPAEDIC SURGERY

## 2023-08-22 ENCOUNTER — HOSPITAL ENCOUNTER (OUTPATIENT)
Dept: MRI IMAGING | Age: 38
Discharge: HOME | End: 2023-08-25
Attending: ORTHOPAEDIC SURGERY

## 2023-08-22 DIAGNOSIS — S83.92XA SPRAIN OF LEFT KNEE, UNSPECIFIED LIGAMENT, INITIAL ENCOUNTER: ICD-10-CM

## 2023-08-28 ENCOUNTER — OFFICE VISIT (OUTPATIENT)
Dept: ORTHOPEDIC SURGERY | Age: 38
End: 2023-08-28
Payer: COMMERCIAL

## 2023-08-28 DIAGNOSIS — S83.222D PERIPHERAL TEAR OF MEDIAL MENISCUS OF LEFT KNEE AS CURRENT INJURY, SUBSEQUENT ENCOUNTER: ICD-10-CM

## 2023-08-28 DIAGNOSIS — M17.12 PRIMARY OSTEOARTHRITIS OF LEFT KNEE: Primary | ICD-10-CM

## 2023-08-28 PROCEDURE — 99214 OFFICE O/P EST MOD 30 MIN: CPT | Performed by: ORTHOPAEDIC SURGERY

## 2023-08-28 PROCEDURE — 20610 DRAIN/INJ JOINT/BURSA W/O US: CPT | Performed by: ORTHOPAEDIC SURGERY

## 2023-08-28 RX ORDER — METHYLPREDNISOLONE ACETATE 80 MG/ML
80 INJECTION, SUSPENSION INTRA-ARTICULAR; INTRALESIONAL; INTRAMUSCULAR; SOFT TISSUE ONCE
Status: COMPLETED | OUTPATIENT
Start: 2023-08-28 | End: 2023-08-28

## 2023-08-28 RX ORDER — DICLOFENAC SODIUM 75 MG/1
75 TABLET, DELAYED RELEASE ORAL 2 TIMES DAILY
Qty: 60 TABLET | Refills: 0 | Status: SHIPPED | OUTPATIENT
Start: 2023-08-28

## 2023-08-28 RX ADMIN — METHYLPREDNISOLONE ACETATE 80 MG: 80 INJECTION, SUSPENSION INTRA-ARTICULAR; INTRALESIONAL; INTRAMUSCULAR; SOFT TISSUE at 16:38
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
## 2023-10-02 ENCOUNTER — TELEPHONE (OUTPATIENT)
Dept: ORTHOPEDIC SURGERY | Age: 38
End: 2023-10-02

## 2023-12-12 ENCOUNTER — OFFICE VISIT (OUTPATIENT)
Dept: ORTHOPEDIC SURGERY | Age: 38
End: 2023-12-12
Payer: COMMERCIAL

## 2023-12-12 DIAGNOSIS — S83.222D PERIPHERAL TEAR OF MEDIAL MENISCUS OF LEFT KNEE AS CURRENT INJURY, SUBSEQUENT ENCOUNTER: ICD-10-CM

## 2023-12-12 DIAGNOSIS — M17.12 PRIMARY OSTEOARTHRITIS OF LEFT KNEE: Primary | ICD-10-CM

## 2023-12-12 PROCEDURE — 99213 OFFICE O/P EST LOW 20 MIN: CPT | Performed by: ORTHOPAEDIC SURGERY
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
## (undated) DEVICE — DRSG AQUACEL SURG 3.5 X 10IN -- CONVERT TO ITEM 370183

## (undated) DEVICE — SUTURE VCRL SZ 0 L27IN ABSRB UD L36MM CP-1 1/2 CIR REV CUT J267H

## (undated) DEVICE — BLADE SHV 3.5MM TOMCAT

## (undated) DEVICE — HANDPIECE SET WITH COAXIAL HIGH FLOW TIP AND SUCTION TUBE: Brand: INTERPULSE

## (undated) DEVICE — GOWN,REINFORCED,POLY,AURORA,XXLARGE,STR: Brand: MEDLINE

## (undated) DEVICE — [RESECTOR CUTTER, ARTHROSCOPIC SHAVER BLADE,  DO NOT RESTERILIZE,  DO NOT USE IF PACKAGE IS DAMAGED,  KEEP DRY,  KEEP AWAY FROM SUNLIGHT]: Brand: FORMULA

## (undated) DEVICE — 4.0MM ROUND FAST CUTTING BUR

## (undated) DEVICE — SUT ETHLN 3-0 18IN PS1 BLK --

## (undated) DEVICE — TETRA-FLEX CF WOVEN LATEX FREE ELASTIC BANDAGE 6" X 11 YD: Brand: TETRA-FLEX™CF

## (undated) DEVICE — DRAPE,ORTHOMAX,ARTHRO T ,W/ POUCH: Brand: MEDLINE

## (undated) DEVICE — COVER,TABLE,HEAVY DUTY,79"X110",STRL: Brand: MEDLINE

## (undated) DEVICE — PRECISION THIN (9.0 X 0.38 X 31.0MM)

## (undated) DEVICE — INTEGUSEAL MICROBIAL SEALANT: Brand: AVANOS

## (undated) DEVICE — (D)PREP SKN CHLRAPRP APPL 26ML -- CONVERT TO ITEM 371833

## (undated) DEVICE — SPLINT KNEE UNIV FOR LESS THAN 36IN L20IN FOAM LAM E CNTCT

## (undated) DEVICE — DRAPE,TOP,102X53,STERILE: Brand: MEDLINE

## (undated) DEVICE — SUTURE 5 MERS GRN 30 TO 40 IN D9211

## (undated) DEVICE — SURGICAL PROCEDURE PACK BASIC ST FRANCIS

## (undated) DEVICE — CARDINAL HEALTH FLEXIBLE LIGHT HANDLE COVER: Brand: CARDINAL HEALTH

## (undated) DEVICE — Device

## (undated) DEVICE — BANDAGE COMPR SELF ADH 5 YDX4 IN TAN STRL PREMIERPRO LF

## (undated) DEVICE — STOCKINETTE TUBE 9X48 -- MEDICHOICE

## (undated) DEVICE — DRAPE,U/SHT,SPLIT,FILM,60X84,STERILE: Brand: MEDLINE

## (undated) DEVICE — SOLUTION IRRIG 3000ML 0.9% SOD CHL FLX CONT 0797208] ICU MEDICAL INC]

## (undated) DEVICE — STERILE SLEEVE: Brand: CONVERTORS

## (undated) DEVICE — NDL SPNE QNCKE 18GX3.5IN LF --

## (undated) DEVICE — BLADE SHV CUT MENIS AGG + 4MM --

## (undated) DEVICE — SUTURE ETHBND 2 L30IN NONABSORBABLE GRN WHT LT GRN MO-7 D8793

## (undated) DEVICE — DRSG AQUACEL SURG 3.5X6IN -- CONVERT TO ITEM 369227

## (undated) DEVICE — SUTURE PROL SZ 2-0 L18IN NONABSORBABLE BLU FS L26MM 3/8 CIR 8685H

## (undated) DEVICE — ZIMMER® STERILE DISPOSABLE TOURNIQUET CUFF WITH PROTECTIVE SLEEVE, DUAL PORT, SINGLE BLADDER, 34 IN. (86 CM)

## (undated) DEVICE — INFLOW CASSETTE TUBING, DO NOT USE IF PACKAGE IS DAMAGED: Brand: CROSSFLOW

## (undated) DEVICE — ZIMMER® STERILE DISPOSABLE TOURNIQUET CUFF PROTECTIVE SLEEVE (FOR USE WITH ZIMMER 42 IN. (107 CM) DISPOSABLE CUFF)

## (undated) DEVICE — STOCKINETTE,IMPERVIOUS,12X48,STERILE: Brand: MEDLINE

## (undated) DEVICE — STRIP,CLOSURE,WOUND,MEDI-STRIP,1/2X4: Brand: MEDLINE